# Patient Record
Sex: FEMALE | Race: BLACK OR AFRICAN AMERICAN | Employment: FULL TIME | URBAN - METROPOLITAN AREA
[De-identification: names, ages, dates, MRNs, and addresses within clinical notes are randomized per-mention and may not be internally consistent; named-entity substitution may affect disease eponyms.]

---

## 2017-04-06 ENCOUNTER — OFFICE VISIT (OUTPATIENT)
Dept: FAMILY MEDICINE CLINIC | Age: 52
End: 2017-04-06

## 2017-04-06 VITALS
TEMPERATURE: 97.4 F | WEIGHT: 196 LBS | HEIGHT: 65 IN | RESPIRATION RATE: 18 BRPM | OXYGEN SATURATION: 95 % | SYSTOLIC BLOOD PRESSURE: 128 MMHG | BODY MASS INDEX: 32.65 KG/M2 | HEART RATE: 84 BPM | DIASTOLIC BLOOD PRESSURE: 81 MMHG

## 2017-04-06 DIAGNOSIS — M54.50 ACUTE BILATERAL LOW BACK PAIN WITHOUT SCIATICA: Primary | ICD-10-CM

## 2017-04-06 LAB
BILIRUB UR QL STRIP: NEGATIVE
GLUCOSE UR-MCNC: NEGATIVE MG/DL
KETONES P FAST UR STRIP-MCNC: NEGATIVE MG/DL
PH UR STRIP: 7 [PH] (ref 4.6–8)
PROT UR QL STRIP: NORMAL MG/DL
SP GR UR STRIP: 1.02 (ref 1–1.03)
UA UROBILINOGEN AMB POC: NORMAL (ref 0.2–1)
URINALYSIS CLARITY POC: CLEAR
URINALYSIS COLOR POC: YELLOW
URINE BLOOD POC: NEGATIVE
URINE LEUKOCYTES POC: NEGATIVE
URINE NITRITES POC: NEGATIVE

## 2017-04-06 RX ORDER — CYCLOBENZAPRINE HCL 10 MG
10 TABLET ORAL
Qty: 15 TAB | Refills: 0 | Status: SHIPPED | OUTPATIENT
Start: 2017-04-06 | End: 2017-04-25 | Stop reason: ALTCHOICE

## 2017-04-06 RX ORDER — IBUPROFEN 800 MG/1
800 TABLET ORAL
Qty: 30 TAB | Refills: 0 | Status: SHIPPED | OUTPATIENT
Start: 2017-04-06 | End: 2017-04-25 | Stop reason: ALTCHOICE

## 2017-04-06 NOTE — PROGRESS NOTES
Patient: Lachelle Cuevas MRN: [id number]  SSN: [id number]    YOB: 1965  Age: 45 y.o. Sex: female      Date of Service: 4/6/2017   Provider: YADIEL Luke   Office Location:   [address] [facility], [address] [place] [address], [address]  Office Phone: [phone]  Office Fax: [phone]        REASON FOR VISIT:   Chief Complaint   Patient presents with    Side Pain     pt presents for right and left side pain  \" pt states \" that pt has been having this pain for about 4 days \" pt states \" that pain is really more to the back area\"        VITALS:   Visit Vitals    /81    Pulse 84    Temp 97.4 °F (36.3 °C) (Oral)    Resp 18    Ht 5' 5\" (1.651 m)    Wt 196 lb (88.9 kg)    LMP 01/30/2016    SpO2 95%    BMI 32.62 kg/m2       MEDICATIONS:   Current Outpatient Prescriptions   Medication Sig Dispense Refill    polyethylene glycol (MIRALAX) 17 gram/dose powder   0    amphetamine-dextroamphetamine XR (ADDERALL XR) 20 mg XR capsule Take 40 mg by mouth daily. 0    citalopram (CELEXA) 10 mg tablet Take  by mouth daily.  loratadine (CLARITIN) 10 mg tablet Take 10 mg by mouth daily as needed.  gabapentin (NEURONTIN) 100 mg capsule   0    oxyCODONE-acetaminophen (PERCOCET) 5-325 mg per tablet   0    aspirin delayed-release 81 mg tablet Take  by mouth daily.  simvastatin (ZOCOR) 20 mg tablet Take  by mouth nightly.           ALLERGIES:   Allergies   Allergen Reactions    Levaquin [Levofloxacin] Rash and Itching        ACTIVE MEDICAL PROBLEMS:  Patient Active Problem List   Diagnosis Code    Anemia D64.9    DDD (degenerative disc disease) [id number]    DJD (degenerative joint disease) of cervical spine M47.812    HLD (hyperlipidemia) E78.5    BMI 35.0-35.9,adult Z68.35    ADD (attention deficit disorder) F98.8    PTSD (post-traumatic stress disorder) F43.10    Depression F32.9        MEDICAL/SURGICAL HISTORY:  Past Medical History:   Diagnosis Date  Anemia 3/1/2010    Ankle fracture 5/09    right ankle    Cervical spine pain     Degenerative cervical disc     Seasonal allergic rhinitis     Thyroid disease       Past Surgical History:   Procedure Laterality Date    HX GI  1990\"    bowl obstruction repair    HX GYN  1's    etopic pregnancy    HX SUSANNA AND BSO  12/2015    HX TUBAL LIGATION          FAMILY HISTORY:  Family History   Problem Relation Age of Onset    Cancer Father      lung cancer    Diabetes Mother     Hypertension Mother     Diabetes Maternal Aunt     Diabetes Maternal Grandmother         SOCIAL HISTORY:  Social History   Substance Use Topics    Smoking status: Never Smoker    Smokeless tobacco: Never Used    Alcohol use Yes          HISTORY OF PRESENT ILLNESS:   Kierra Beard is a 45 y.o. female who presents to the office for acute care only. PCP: Mayank Robert at [facility]. Patient complains of gradual onset bilateral low back pain radiating x about 4 days. Pain is described as \"hot, dull and achy,\" and is rated a 6/10 at its worst. Patient has tried some OTC BC powder with little relief. She does have a history of lumbar DJD and has experienced sciatica in the past, but states this pain feels different. Pain is a bit more vague and \"deep\" and she is concerned that it might be coming from an organ. She denies distal numbness, tingling, or weakness. No bowel or bladder dysfunction. She notes that, if anything, chronic constipation has actually improved over the past week. REVIEW OF SYSTEMS:  Review of Systems   Constitutional: Negative for chills and fever. Respiratory: Negative for cough and shortness of breath. Cardiovascular: Negative for chest pain and palpitations. Gastrointestinal: Positive for constipation. Negative for abdominal pain, diarrhea, nausea and vomiting. Musculoskeletal: Positive for back pain and myalgias. Negative for neck pain. Neurological: Positive for headaches.  Negative for tingling, sensory change and focal weakness. PHYSICAL EXAMINATION:  Physical Exam   Constitutional: She is well-developed, well-nourished, and in no distress. Cardiovascular: Normal rate, regular rhythm and normal heart sounds. Exam reveals no gallop and no friction rub. No murmur heard. Pulmonary/Chest: Effort normal and breath sounds normal. She has no wheezes. She has no rales. Musculoskeletal:        Cervical back: She exhibits no bony tenderness, no edema and no deformity. Thoracic back: She exhibits no bony tenderness, no edema and no deformity. Lumbar back: She exhibits decreased range of motion ( AROM limited by pain in flexion and extension, but not lateral flexion or rotation), bony tenderness ( L spine and b/l SI joints), pain and spasm ( lumbar paraspinal). She exhibits no edema and no deformity. Back:    Skin: Skin is warm and dry. No rash noted. RESULTS:  Results for orders placed or performed in visit on 04/06/17   AMB POC URINALYSIS DIP STICK AUTO W/O MICRO   Result Value Ref Range    Color (UA POC) Yellow     Clarity (UA POC) Clear     Glucose (UA POC) Negative Negative    Bilirubin (UA POC) Negative Negative    Ketones (UA POC) Negative Negative    Specific gravity (UA POC) 1.025 1.001 - 1.035    Blood (UA POC) Negative Negative    pH (UA POC) 7.0 4.6 - 8.0    Protein (UA POC) 1+ Negative mg/dL    Urobilinogen (UA POC) 0.2 mg/dL 0.2 - 1    Nitrites (UA POC) Negative Negative    Leukocyte esterase (UA POC) Negative Negative        ASSESSMENT/PLAN:  Camillo Hodgkins was seen today for side pain. Diagnoses and all orders for this visit:    Acute bilateral low back pain without sciatica  - Reassured of normal urinalysis. Pain does seem to be musculoskeletal, but exact origin is difficult to pinpoint.   - Patient has known degenerative changes of L spine, but tenderness over b/l SI joints appears to be new.  Will order sacral x-ray for further evaluation  - In the meantime, will try flexeril and high dose ibuprofen for pain relief. Also encouraged use of ice or heat, whichever feels better  Orders:   -     AMB POC URINALYSIS DIP STICK AUTO W/O MICRO  -     XR SI JTS MIN 3 V; Future  -     cyclobenzaprine (FLEXERIL) 10 mg tablet; Take 1 Tab by mouth three (3) times daily as needed for Muscle Spasm(s). -     ibuprofen (MOTRIN) 800 mg tablet; Take 1 Tab by mouth every eight (8) hours as needed for Pain. Follow up with me or with PCP pending results of x-ray and response to medication    Patient expresses understanding and is agreeable with the above plan.         PATIENT CARE TEAM:   Patient Care Team:  Abena Chen NP as PCP - General (Family Practice)       YADIEL Hutton   April 6, 2017    5:13 PM

## 2017-04-07 ENCOUNTER — HOSPITAL ENCOUNTER (OUTPATIENT)
Age: 52
Discharge: HOME | End: 2017-04-07
Attending: ORTHOPAEDIC SURGERY
Payer: COMMERCIAL

## 2017-04-07 ENCOUNTER — HOSPITAL ENCOUNTER (OUTPATIENT)
Dept: GENERAL RADIOLOGY | Age: 52
Discharge: HOME | End: 2017-04-07
Attending: ORTHOPAEDIC SURGERY
Payer: COMMERCIAL

## 2017-04-07 DIAGNOSIS — M54.50 ACUTE BILATERAL LOW BACK PAIN WITHOUT SCIATICA: ICD-10-CM

## 2017-04-07 DIAGNOSIS — M25.561 RIGHT KNEE PAIN: ICD-10-CM

## 2017-04-07 PROCEDURE — 73721 MRI JNT OF LWR EXTRE W/O DYE: CPT

## 2017-04-07 PROCEDURE — 72202 X-RAY EXAM SI JOINTS 3/> VWS: CPT

## 2017-04-10 ENCOUNTER — TELEPHONE (OUTPATIENT)
Dept: FAMILY MEDICINE CLINIC | Age: 52
End: 2017-04-10

## 2017-04-10 NOTE — TELEPHONE ENCOUNTER
Please let the patient know that her x-ray was normal. No significant arthritis of the sacroiliac joints. Hopefully her pain is improving with the medications I prescribed, but if not, would recommend follow up with her PCP. thanks!

## 2017-04-25 ENCOUNTER — OFFICE VISIT (OUTPATIENT)
Dept: FAMILY MEDICINE CLINIC | Age: 52
End: 2017-04-25

## 2017-04-25 VITALS
WEIGHT: 197.2 LBS | TEMPERATURE: 98 F | BODY MASS INDEX: 32.86 KG/M2 | DIASTOLIC BLOOD PRESSURE: 88 MMHG | RESPIRATION RATE: 16 BRPM | HEART RATE: 77 BPM | SYSTOLIC BLOOD PRESSURE: 130 MMHG | OXYGEN SATURATION: 97 % | HEIGHT: 65 IN

## 2017-04-25 DIAGNOSIS — M51.26 DISCOGENIC SYNDROME, LUMBAR: Primary | ICD-10-CM

## 2017-04-25 DIAGNOSIS — G89.29 CHRONIC BILATERAL LOW BACK PAIN WITH BILATERAL SCIATICA: ICD-10-CM

## 2017-04-25 DIAGNOSIS — M54.42 CHRONIC BILATERAL LOW BACK PAIN WITH BILATERAL SCIATICA: ICD-10-CM

## 2017-04-25 DIAGNOSIS — Z11.59 NEED FOR HEPATITIS C SCREENING TEST: ICD-10-CM

## 2017-04-25 DIAGNOSIS — J30.1 SEASONAL ALLERGIC RHINITIS DUE TO POLLEN: ICD-10-CM

## 2017-04-25 DIAGNOSIS — M54.41 CHRONIC BILATERAL LOW BACK PAIN WITH BILATERAL SCIATICA: ICD-10-CM

## 2017-04-25 DIAGNOSIS — Z23 ENCOUNTER FOR IMMUNIZATION: ICD-10-CM

## 2017-04-25 RX ORDER — TRAMADOL HYDROCHLORIDE 50 MG/1
50 TABLET ORAL
Qty: 40 TAB | Refills: 0 | Status: SHIPPED | OUTPATIENT
Start: 2017-04-25 | End: 2018-09-07

## 2017-04-25 RX ORDER — GABAPENTIN 300 MG/1
CAPSULE ORAL
Qty: 60 CAP | Refills: 1 | Status: SHIPPED | OUTPATIENT
Start: 2017-04-25 | End: 2017-09-19 | Stop reason: SDUPTHER

## 2017-04-25 RX ORDER — CETIRIZINE HCL 10 MG
10 TABLET ORAL
Qty: 30 TAB | Refills: 3 | Status: SHIPPED | OUTPATIENT
Start: 2017-04-25

## 2017-04-25 NOTE — PROGRESS NOTES
1. Have you been to the ER, urgent care clinic since your last visit? Hospitalized since your last visit? NO    2. Have you seen or consulted any other health care providers outside of the [address] [place] [address] since your last visit? Include any pap smears or colon screening. NO      3. Vaccines?  tdap    Rx updated

## 2017-04-25 NOTE — PATIENT INSTRUCTIONS
Back Pain: Care Instructions  Your Care Instructions    Back pain has many possible causes. It is often related to problems with muscles and ligaments of the back. It may also be related to problems with the nerves, discs, or bones of the back. Moving, lifting, standing, sitting, or sleeping in an awkward way can strain the back. Sometimes you don't notice the injury until later. Arthritis is another common cause of back pain. Although it may hurt a lot, back pain usually improves on its own within several weeks. Most people recover in 12 weeks or less. Using good home treatment and being careful not to stress your back can help you feel better sooner. Follow-up care is a key part of your treatment and safety. Be sure to make and go to all appointments, and call your doctor if you are having problems. Its also a good idea to know your test results and keep a list of the medicines you take. How can you care for yourself at home? · Sit or lie in positions that are most comfortable and reduce your pain. Try one of these positions when you lie down:  ¨ Lie on your back with your knees bent and supported by large pillows. ¨ Lie on the floor with your legs on the seat of a sofa or chair. [place] on your side with your knees and hips bent and a pillow between your legs. ¨ Lie on your stomach if it does not make pain worse. · Do not sit up in bed, and avoid soft couches and twisted positions. Bed rest can help relieve pain at first, but it delays healing. Avoid bed rest after the first day of back pain. · Change positions every 30 minutes. If you must sit for long periods of time, take breaks from sitting. Get up and walk around, or lie in a comfortable position. · Try using a heating pad on a low or medium setting for 15 to 20 minutes every 2 or 3 hours. Try a warm shower in place of one session with the heating pad. · You can also try an ice pack for 10 to 15 minutes every 2 to 3 hours.  Put a thin cloth between the ice pack and your skin. · Take pain medicines exactly as directed. ¨ If the doctor gave you a prescription medicine for pain, take it as prescribed. ¨ If you are not taking a prescription pain medicine, ask your doctor if you can take an over-the-counter medicine. · Take short walks several times a day. You can start with 5 to 10 minutes, 3 or 4 times a day, and work up to longer walks. Walk on level surfaces and avoid hills and stairs until your back is better. · Return to work and other activities as soon as you can. Continued rest without activity is usually not good for your back. · To prevent future back pain, do exercises to stretch and strengthen your back and stomach. Learn how to use good posture, safe lifting techniques, and proper body mechanics. When should you call for help? Call your doctor now or seek immediate medical care if:  · You have new or worsening numbness in your legs. · You have new or worsening weakness in your legs. (This could make it hard to stand up.)  · You lose control of your bladder or bowels. Watch closely for changes in your health, and be sure to contact your doctor if:  · Your pain gets worse. · You are not getting better after 2 weeks. Where can you learn more? Go to http://abbe-katrina.info/. Enter J287 in the search box to learn more about \"Back Pain: Care Instructions. \"  Current as of: May 23, 2016  Content Version: 11.2  © 3162-2169 [employer]. Care instructions adapted under license by [employer] (which disclaims liability or warranty for this information). If you have questions about a medical condition or this instruction, always ask your healthcare professional. [address] any warranty or liability for your use of this information. Gabapentin (By mouth)   Gabapentin (tex-a-PEN-tin)  Treats seizures and pain caused by shingles.    Brand Name(s):ACTIVE-PAC with Gabapentin, FusePaq Fanatrex, Gralise, Neurontin   There may be other brand names for this medicine. When This Medicine Should Not Be Used: This medicine is not right for everyone. Do not use it if you had an allergic reaction to gabapentin. How to Use This Medicine:   Capsule, Liquid, Tablet  · Take your medicine as directed. Your dose may need to be changed several times to find what works best for you. If you have epilepsy, do not allow more than 12 hours to pass between doses. · Capsule: Swallow the capsule whole with plenty of water. Do not open, crush, or chew it. · Gralise® tablet: Swallow the tablet whole . Do not crush, break, or chew it. · Neurontin® tablet: If you break a tablet into 2 pieces, use the second half as your next dose. If you don't use it within 28 days, throw it away. · Measure the oral liquid medicine with a marked measuring spoon, oral syringe, or medicine cup. · This medicine should come with a Medication Guide. Ask your pharmacist for a copy if you do not have one. · Missed dose: Take a dose as soon as you remember. If it is almost time for your next dose, wait until then and take a regular dose. Do not take extra medicine to make up for a missed dose. · Store the medicine in a closed container at room temperature, away from heat, moisture, and direct light. Store the Neurontin® oral liquid in the refrigerator. Do not freeze. Drugs and Foods to Avoid:   Ask your doctor or pharmacist before using any other medicine, including over-the-counter medicines, vitamins, and herbal products. · Some medicines can affect how gabapentin works. Tell your doctor if you also use any of the following:   ¨ Hydrocodone  ¨ Morphine  · If you take an antacid, wait at least 2 hours before you take gabapentin. · Tell your doctor if you use anything else that makes you sleepy. Some examples are allergy medicine, narcotic pain medicine, and alcohol.   Warnings While Using This Medicine:   · Tell your doctor if you are pregnant or breastfeeding, or if you have kidney problems or are receiving dialysis. Tell your doctor if you have a history of depression or mental health problems. · This medicine may increase depression or thoughts of suicide. Tell your doctor right away if you start to feel more depressed or think about hurting yourself. · This medicine may cause a serious allergic reaction called multiorgan hypersensitivity, which can damage organs and be life-threatening. · Do not stop using this medicine suddenly. Your doctor will need to slowly decrease your dose before you stop it completely. If you take this medicine to prevent seizures, your seizures may return or occur more often if you stop this medicine suddenly. · This medicine may make you dizzy or drowsy. Do not drive or do anything else that could be dangerous until you know how this medicine affects you. · Tell any doctor or dentist who treats you that you are using this medicine. This medicine may affect certain medical test results. · Your doctor will check your progress and the effects of this medicine at regular visits. Keep all appointments. · Keep all medicine out of the reach of children. Never share your medicine with anyone.   Possible Side Effects While Using This Medicine:   Call your doctor right away if you notice any of these side effects:  · Allergic reaction: Itching or hives, swelling in your face or hands, swelling or tingling in your mouth or throat, chest tightness, trouble breathing  · Behavior problems, aggression, restlessness, trouble concentrating, moodiness (especially in children)  · Blistering, peeling, red skin rash  · Change in how much or how often you urinate, bloody or cloudy urine,  · Chest pain, fast heartbeat, trouble breathing  · Dark urine or pale stools, nausea, vomiting, loss of appetite, stomach pain, yellow skin or eyes  · Fever, rash, swollen or tender glands in the neck, armpit, or groin  · Problems with coordination, shakiness, unsteadiness  · Rapid weight gain, swelling in your hands, ankles, or feet  · Unusual moods or behaviors, thoughts of hurting yourself, feeling depressed  If you notice these less serious side effects, talk with your doctor:   · Dizziness, drowsiness, sleepiness, tiredness  If you notice other side effects that you think are caused by this medicine, tell your doctor. Call your doctor for medical advice about side effects. You may report side effects to FDA at 8-377-AQX-6131  © 2016 [address] is for End User's use only and may not be sold, redistributed or otherwise used for commercial purposes. The above information is an  only. It is not intended as medical advice for individual conditions or treatments. Talk to your doctor, nurse or pharmacist before following any medical regimen to see if it is safe and effective for you. Tramadol (By mouth)   Tramadol (TRAM-a-dol)  Treats moderate to severe pain. This medicine is a narcotic pain reliever. Brand Name(s):ConZip, FusePaq Synapryn, [place], Ultram, Ultram ER, traMADol HCl   There may be other brand names for this medicine. When This Medicine Should Not Be Used: This medicine is not right for everyone. Do not use if you had an allergic reaction to tramadol or other narcotic medicine. Tell your doctor if you have severe asthma or other breathing problems. How to Use This Medicine:   Liquid, Capsule, Tablet, Dissolving Tablet, Long Acting Tablet  · Take your medicine as directed. Your dose may need to be changed several times to find what works best for you. · Make sure your hands are dry before you handle the disintegrating tablet. Peel back the foil from the blister pack, then remove the tablet. Do not push the tablet through the foil. Place the tablet in your mouth. After it has melted, swallow or take a drink of water. · Swallow the extended-release tablet whole.  Do not crush, break, or chew it. · Drink plenty of liquids to help avoid constipation. · Missed dose: Take a dose as soon as you remember. If it is almost time for your next dose, wait until then and take a regular dose. Do not take extra medicine to make up for a missed dose. · Store the medicine in a closed container at room temperature, away from heat, moisture, and direct light. Drugs and Foods to Avoid:   Ask your doctor or pharmacist before using any other medicine, including over-the-counter medicines, vitamins, and herbal products. · Some medicines and foods can affect how tramadol works. Tell your doctor if you are using an MAO inhibitor or medicine for depression (such as amitriptyline, fluoxetine, paroxetine). · Tell your doctor if you are also using any of the following:  ¨ Cale's wort, digoxin, erythromycin, ketoconazole, linezolid, lithium, quinidine, rifampin, promethazine, carbamazepine, or cyclobenzaprine  ¨ Blood thinner (such as warfarin), medicine for migraine headaches, or another narcotic medicine  · Tell your doctor if you use anything else that makes you sleepy. Some examples are allergy medicine, narcotic pain medicine, and alcohol. · Do not drink alcohol while you are using this medicine. Warnings While Using This Medicine:   · Tell your doctor if you are pregnant or breastfeeding, or if you have kidney disease, liver disease (including cirrhosis), or breathing problems. Tell your doctor if you have a history of head injury, seizures, drug addiction, or depression or similar emotional problems. · This medicine may cause the following problems:  ¨ High risk of overdose  ¨ Serotonin syndrome  · This medicine may make you dizzy or drowsy. Do not drive or doing anything else that could be dangerous until you know how this medicine affects you. · This medicine can be habit-forming. Do not use more than your prescribed dose. Call your doctor if you think your medicine is not working.   · Do not stop using this medicine suddenly. Your doctor will need to slowly decrease your dose before you stop it completely. · Tell any doctor or dentist who treats you that you are using this medicine. · Some forms of this medicine contain phenylalanine (aspartame). · This medicine may cause constipation, especially with long-term use. Ask your doctor if you should use a laxative to prevent and treat constipation. · Keep all medicine out of the reach of children. Never share your medicine with anyone. Possible Side Effects While Using This Medicine:   Call your doctor right away if you notice any of these side effects:  · Allergic reaction: Itching or hives, swelling in your face or hands, swelling or tingling in your mouth or throat, chest tightness, trouble breathing  · Anxiety, restlessness, fast heartbeat, fever, sweating, muscle spasms, nausea, vomiting, diarrhea, seeing or hearing things that are not there  · Blistering, peeling, or red skin rash  · Lightheadedness, dizziness, or fainting  · Seizures  · Severe sleepiness or unusual drowsiness  · Trouble breathing  If you notice these less serious side effects, talk with your doctor:   · Feeling nervous or shaky  · Headache  · Mild itching  · Nausea, vomiting, constipation, loss of appetite  · Weakness  If you notice other side effects that you think are caused by this medicine, tell your doctor. Call your doctor for medical advice about side effects. You may report side effects to FDA at 4-801-FDA-9023  © 2016 [address] is for End User's use only and may not be sold, redistributed or otherwise used for commercial purposes. The above information is an  only. It is not intended as medical advice for individual conditions or treatments. Talk to your doctor, nurse or pharmacist before following any medical regimen to see if it is safe and effective for you.   Cetirizine (By mouth)   Cetirizine ([id number]-[id number])  Treats hay fever and allergy symptoms, hives, and itching. Brand Name(s):[facility] Allergy, [facility] All Day Allergy, Children's Cetirizine Hydrochloride, [facility] Day Allergy, [facility]'s ZyrTEC, [facility] ZyrTEC Allergy, [facility] All Day Allergy, [facility] All Day Allergy, [facility] Day Allergy, [facility] All Day Allergy, [facility], [place] Cetirizine Hydrochloride, [facility] Allergy Relief, [facility] Cetirizine Hydrochloride, [facility] Children[facility] Allergy Relief   There may be other brand names for this medicine. When This Medicine Should Not Be Used: You should not use this medicine if you have had an allergic reaction to cetirizine or hydroxyzine (Atarax®, Vistaril®). Do not give any over-the-counter (OTC) cough and cold medicine to a baby or child under 3years old. Using these medicines in very young children might cause serious or possibly life-threatening side effects. How to Use This Medicine:   Tablet, Chewable Tablet, Capsule, Liquid  · Your doctor will tell you how much medicine to use. Do not use more than directed. · You may take this medicine with or without food. · Measure the oral liquid medicine with a marked measuring spoon, oral syringe, or medicine cup. · Chew the chewable tablet thoroughly before you swallow it. You may also let the chewable tablet melt slowly in your mouth. · Swallow the tablet whole with a full glass of water. Do not chew, crush, or break it. If a dose is missed:   · Take a dose as soon as you remember. If it is almost time for your next dose, wait until then and take a regular dose. Do not take extra medicine to make up for a missed dose. How to Store and Dispose of This Medicine:   · Store the medicine in a closed container at room temperature, away from heat, moisture, and direct light. · The liquid medicine may be kept in the refrigerator. Do not freeze.   · Ask your pharmacist, doctor, or health caregiver about the best way to dispose of any outdated medicine or medicine no longer needed. · Keep all medicine out of the reach of children. Never share your medicine with anyone. Drugs and Foods to Avoid:   Ask your doctor or pharmacist before using any other medicine, including over-the-counter medicines, vitamins, and herbal products. · Make sure your doctor knows if you are also using theophylline. · Avoid drinking alcohol while using this medicine. · Make sure your doctor knows if you are also using other medicines that might make you sleepy such as cold or allergy medicines, muscle relaxants, tranquilizers, sleeping pills, or strong pain killers. Warnings While Using This Medicine:   · Talk with your doctor before taking this medicine if you are pregnant or breast feeding. · Check with your doctor before using this medicine if you have kidney disease or liver disease. · This medicine may make you dizzy or drowsy. Avoid driving, using machines, or doing anything else that could be dangerous if you are not alert. Possible Side Effects While Using This Medicine:   Call your doctor right away if you notice any of these side effects:  · Allergic reaction: Itching or hives, swelling in your face or hands, swelling or tingling in your mouth or throat, chest tightness, trouble breathing  · Skin or whites of your eyes turn yellow. If you notice these less serious side effects, talk with your doctor:   · Drowsiness or dizziness. · Dry mouth. If you notice other side effects that you think are caused by this medicine, tell your doctor. Call your doctor for medical advice about side effects. You may report side effects to FDA at 1-750-FDA-7843  © 2016 [address] is for End User's use only and may not be sold, redistributed or otherwise used for commercial purposes. The above information is an  only.  It is not intended as medical advice for individual conditions or treatments. Talk to your doctor, nurse or pharmacist before following any medical regimen to see if it is safe and effective for you.

## 2017-04-25 NOTE — MR AVS SNAPSHOT
Visit Information Date & Time Provider Department Dept. Phone Encounter #  
 4/25/2017  7:45 AM Brandi Rangel  [address] [id number] Upcoming Health Maintenance Date Due  
 BREAST CANCER SCRN MAMMOGRAM 7/1/2017 PAP AKA CERVICAL CYTOLOGY 7/1/2018 COLONOSCOPY 6/21/2021 DTaP/Tdap/Td series (2 - Td) 4/25/2027 Allergies as of 4/25/2017  Review Complete On: 4/25/2017 By: Brandi Rangel NP Severity Noted Reaction Type Reactions Levaquin [Levofloxacin]  03/01/2010   Topical Rash, Itching Current Immunizations  Reviewed on 12/16/2016 Name Date Influenza Vaccine 11/16/2016, 11/15/2013 Influenza Vaccine (Quad) PF 8/25/2015, 9/29/2014 Influenza Vaccine Split 10/17/2012 PPD 7/26/2011 TB Skin Test (PPD) Intradermal 12/16/2016, 3/16/2015  2:25 PM  
 Tdap  Incomplete Not reviewed this visit You Were Diagnosed With   
  
 Codes Comments Discogenic syndrome, lumbar    -  Primary ICD-10-CM: M51.26 
ICD-9-CM: 722.10 Encounter for immunization     ICD-10-CM: O16 ICD-9-CM: V03.89 Chronic bilateral low back pain with bilateral sciatica     ICD-10-CM: M54.42, M54.41, G89.29 ICD-9-CM: 724.2, 724.3, 338.29 Need for hepatitis C screening test     ICD-10-CM: Z11.59 
ICD-9-CM: V73.89 Acute bilateral low back pain without sciatica     ICD-10-CM: M54.5 ICD-9-CM: 724.2, 338.19 Seasonal allergic rhinitis due to pollen     ICD-10-CM: J30.1 ICD-9-CM: 477.0 Vitals BP Pulse Temp Resp Height(growth percentile) Weight(growth percentile) 130/88 (BP 1 Location: Left arm, BP Patient Position: Sitting) 77 98 °F (36.7 °C) (Oral) 16 5' 5\" (1.651 m) 197 lb 3.2 oz (89.4 kg) LMP SpO2 BMI OB Status Smoking Status 01/30/2016 97% 32.82 kg/m2 Hysterectomy Never Smoker Vitals History BMI and BSA Data  Body Mass Index Body Surface Area  
 32.82 kg/m 2 2.02 m 2  
  
  
 Preferred Pharmacy Pharmacy Name Phone [id number] [address] [phone] Your Updated Medication List  
  
   
This list is accurate as of: 4/25/17  8:08 AM.  Always use your most recent med list.  
  
  
  
  
 amphetamine-dextroamphetamine XR 20 mg XR capsule Commonly known as:  ADDERALL XR Take 40 mg by mouth daily. aspirin delayed-release 81 mg tablet Take  by mouth daily. cetirizine 10 mg tablet Commonly known as:  ZYRTEC Take 1 Tab by mouth nightly as needed for Allergies. citalopram 10 mg tablet Commonly known as:  Rell Breeding Take  by mouth daily. gabapentin 300 mg capsule Commonly known as:  NEURONTIN Take one pill nightly for three nights by mouth and if tolerated increase to twice daily by mouth  
  
 polyethylene glycol 17 gram/dose powder Commonly known as:  MIRALAX  
  
 simvastatin 20 mg tablet Commonly known as:  ZOCOR Take  by mouth nightly. traMADol 50 mg tablet Commonly known as:  ULTRAM  
Take 1 Tab by mouth every six (6) hours as needed for Pain. Max Daily Amount: 200 mg. Indications: Pain Prescriptions Printed Refills  
 traMADol (ULTRAM) 50 mg tablet 0 Sig: Take 1 Tab by mouth every six (6) hours as needed for Pain. Max Daily Amount: 200 mg. Indications: Pain Class: Print Route: Oral  
  
Prescriptions Sent to Pharmacy Refills  
 gabapentin (NEURONTIN) 300 mg capsule 1 Sig: Take one pill nightly for three nights by mouth and if tolerated increase to twice daily by mouth Class: Normal  
 Pharmacy: [id number] [address]. Ph #: [phone]  
 cetirizine (ZYRTEC) 10 mg tablet 3 Sig: Take 1 Tab by mouth nightly as needed for Allergies. Class: Normal  
 Pharmacy: [id number] [address]. Ph #: [phone] Route: Oral  
  
We Performed the Following REFERRAL TO ORTHOPEDICS [RXM558 Custom] Comments:  
 Please evaluate for chronic low back pain, discogenic disease with radiculopathy (old patient returning see MRI 2013) TETANUS, DIPHTHERIA TOXOIDS AND ACELLULAR PERTUSSIS VACCINE (TDAP), IN INDIVIDS. >=7, IM [id number] CPT(R)] To-Do List   
 04/25/2017 Lab:  HEPATITIS C AB Referral Information Referral ID Referred By Referred To  
  
 [id number] Luis Daniel Cervantes, Ellis Butterfield MD   
   [address] [place] [address], [address] Phone: [phone] Fax: [phone] Visits Status Start Date End Date 1 New Request 4/25/17 4/25/18 If your referral has a status of pending review or denied, additional information will be sent to support the outcome of this decision. Patient Instructions Back Pain: Care Instructions Your Care Instructions Back pain has many possible causes. It is often related to problems with muscles and ligaments of the back. It may also be related to problems with the nerves, discs, or bones of the back. Moving, lifting, standing, sitting, or sleeping in an awkward way can strain the back. Sometimes you don't notice the injury until later. Arthritis is another common cause of back pain. Although it may hurt a lot, back pain usually improves on its own within several weeks. Most people recover in 12 weeks or less. Using good home treatment and being careful not to stress your back can help you feel better sooner. Follow-up care is a key part of your treatment and safety. Be sure to make and go to all appointments, and call your doctor if you are having problems. Its also a good idea to know your test results and keep a list of the medicines you take. How can you care for yourself at home? · Sit or lie in positions that are most comfortable and reduce your pain. Try one of these positions when you lie down: ¨ Lie on your back with your knees bent and supported by large pillows. ¨ Lie on the floor with your legs on the seat of a sofa or chair. Siri Blazer on your side with your knees and hips bent and a pillow between your legs. ¨ Lie on your stomach if it does not make pain worse. · Do not sit up in bed, and avoid soft couches and twisted positions. Bed rest can help relieve pain at first, but it delays healing. Avoid bed rest after the first day of back pain. · Change positions every 30 minutes. If you must sit for long periods of time, take breaks from sitting. Get up and walk around, or lie in a comfortable position. · Try using a heating pad on a low or medium setting for 15 to 20 minutes every 2 or 3 hours. Try a warm shower in place of one session with the heating pad. · You can also try an ice pack for 10 to 15 minutes every 2 to 3 hours. Put a thin cloth between the ice pack and your skin. · Take pain medicines exactly as directed. ¨ If the doctor gave you a prescription medicine for pain, take it as prescribed. ¨ If you are not taking a prescription pain medicine, ask your doctor if you can take an over-the-counter medicine. · Take short walks several times a day. You can start with 5 to 10 minutes, 3 or 4 times a day, and work up to longer walks. Walk on level surfaces and avoid hills and stairs until your back is better. · Return to work and other activities as soon as you can. Continued rest without activity is usually not good for your back. · To prevent future back pain, do exercises to stretch and strengthen your back and stomach. Learn how to use good posture, safe lifting techniques, and proper body mechanics. When should you call for help? Call your doctor now or seek immediate medical care if: 
· You have new or worsening numbness in your legs. · You have new or worsening weakness in your legs. (This could make it hard to stand up.) · You lose control of your bladder or bowels. Watch closely for changes in your health, and be sure to contact your doctor if: · Your pain gets worse. · You are not getting better after 2 weeks. Where can you learn more? Go to http://abbe-katrina.info/. Enter F684 in the search box to learn more about \"Back Pain: Care Instructions. \" Current as of: May 23, 2016 Content Version: 11.2 © 6960-9581 [employer]. Care instructions adapted under license by [employer] (which disclaims liability or warranty for this information). If you have questions about a medical condition or this instruction, always ask your healthcare professional. [address] any warranty or liability for your use of this information. Gabapentin (By mouth) Gabapentin (tex-a-PEN-tin) Treats seizures and pain caused by shingles. Brand Name(s):ACTIVE-PAC with Gabapentin, FusePaq Fanatrex, Gralise, Neurontin There may be other brand names for this medicine. When This Medicine Should Not Be Used: This medicine is not right for everyone. Do not use it if you had an allergic reaction to gabapentin. How to Use This Medicine:  
Capsule, Liquid, Tablet · Take your medicine as directed. Your dose may need to be changed several times to find what works best for you. If you have epilepsy, do not allow more than 12 hours to pass between doses. · Capsule: Swallow the capsule whole with plenty of water. Do not open, crush, or chew it. · Gralise® tablet: Swallow the tablet whole . Do not crush, break, or chew it. · Neurontin® tablet: If you break a tablet into 2 pieces, use the second half as your next dose. If you don't use it within 28 days, throw it away. · Measure the oral liquid medicine with a marked measuring spoon, oral syringe, or medicine cup. · This medicine should come with a Medication Guide. Ask your pharmacist for a copy if you do not have one. · Missed dose: Take a dose as soon as you remember. If it is almost time for your next dose, wait until then and take a regular dose.  Do not take extra medicine to make up for a missed dose. · Store the medicine in a closed container at room temperature, away from heat, moisture, and direct light. Store the Neurontin® oral liquid in the refrigerator. Do not freeze. Drugs and Foods to Avoid: Ask your doctor or pharmacist before using any other medicine, including over-the-counter medicines, vitamins, and herbal products. · Some medicines can affect how gabapentin works. Tell your doctor if you also use any of the following: ¨ Hydrocodone ¨ Morphine · If you take an antacid, wait at least 2 hours before you take gabapentin. · Tell your doctor if you use anything else that makes you sleepy. Some examples are allergy medicine, narcotic pain medicine, and alcohol. Warnings While Using This Medicine: · Tell your doctor if you are pregnant or breastfeeding, or if you have kidney problems or are receiving dialysis. Tell your doctor if you have a history of depression or mental health problems. · This medicine may increase depression or thoughts of suicide. Tell your doctor right away if you start to feel more depressed or think about hurting yourself. · This medicine may cause a serious allergic reaction called multiorgan hypersensitivity, which can damage organs and be life-threatening. · Do not stop using this medicine suddenly. Your doctor will need to slowly decrease your dose before you stop it completely. If you take this medicine to prevent seizures, your seizures may return or occur more often if you stop this medicine suddenly. · This medicine may make you dizzy or drowsy. Do not drive or do anything else that could be dangerous until you know how this medicine affects you. · Tell any doctor or dentist who treats you that you are using this medicine. This medicine may affect certain medical test results. · Your doctor will check your progress and the effects of this medicine at regular visits. Keep all appointments. · Keep all medicine out of the reach of children. Never share your medicine with anyone. Possible Side Effects While Using This Medicine:  
Call your doctor right away if you notice any of these side effects: · Allergic reaction: Itching or hives, swelling in your face or hands, swelling or tingling in your mouth or throat, chest tightness, trouble breathing · Behavior problems, aggression, restlessness, trouble concentrating, moodiness (especially in children) · Blistering, peeling, red skin rash · Change in how much or how often you urinate, bloody or cloudy urine, 
· Chest pain, fast heartbeat, trouble breathing · Dark urine or pale stools, nausea, vomiting, loss of appetite, stomach pain, yellow skin or eyes · Fever, rash, swollen or tender glands in the neck, armpit, or groin · Problems with coordination, shakiness, unsteadiness · Rapid weight gain, swelling in your hands, ankles, or feet · Unusual moods or behaviors, thoughts of hurting yourself, feeling depressed If you notice these less serious side effects, talk with your doctor: · Dizziness, drowsiness, sleepiness, tiredness If you notice other side effects that you think are caused by this medicine, tell your doctor. Call your doctor for medical advice about side effects. You may report side effects to FDA at 1-009-FDA-9506 © 2016 [address] is for End User's use only and may not be sold, redistributed or otherwise used for commercial purposes. The above information is an  only. It is not intended as medical advice for individual conditions or treatments. Talk to your doctor, nurse or pharmacist before following any medical regimen to see if it is safe and effective for you. Tramadol (By mouth) Tramadol (TRAM-a-dol) Treats moderate to severe pain. This medicine is a narcotic pain reliever. Brand Name(s):ConZip, FusePaq Synapryn, Ryzolt, Ultram, Ultram ER, traMADol HCl There may be other brand names for this medicine. When This Medicine Should Not Be Used: This medicine is not right for everyone. Do not use if you had an allergic reaction to tramadol or other narcotic medicine. Tell your doctor if you have severe asthma or other breathing problems. How to Use This Medicine:  
Liquid, Capsule, Tablet, Dissolving Tablet, Long Acting Tablet · Take your medicine as directed. Your dose may need to be changed several times to find what works best for you. · Make sure your hands are dry before you handle the disintegrating tablet. Peel back the foil from the blister pack, then remove the tablet. Do not push the tablet through the foil. Place the tablet in your mouth. After it has melted, swallow or take a drink of water. · Swallow the extended-release tablet whole. Do not crush, break, or chew it. · Drink plenty of liquids to help avoid constipation. · Missed dose: Take a dose as soon as you remember. If it is almost time for your next dose, wait until then and take a regular dose. Do not take extra medicine to make up for a missed dose. · Store the medicine in a closed container at room temperature, away from heat, moisture, and direct light. Drugs and Foods to Avoid: Ask your doctor or pharmacist before using any other medicine, including over-the-counter medicines, vitamins, and herbal products. · Some medicines and foods can affect how tramadol works. Tell your doctor if you are using an MAO inhibitor or medicine for depression (such as amitriptyline, fluoxetine, paroxetine). · Tell your doctor if you are also using any of the following: 
¨ Cale's wort, digoxin, erythromycin, ketoconazole, linezolid, lithium, quinidine, rifampin, promethazine, carbamazepine, or cyclobenzaprine ¨ Blood thinner (such as warfarin), medicine for migraine headaches, or another narcotic medicine · Tell your doctor if you use anything else that makes you sleepy.  Some examples are allergy medicine, narcotic pain medicine, and alcohol. · Do not drink alcohol while you are using this medicine. Warnings While Using This Medicine: · Tell your doctor if you are pregnant or breastfeeding, or if you have kidney disease, liver disease (including cirrhosis), or breathing problems. Tell your doctor if you have a history of head injury, seizures, drug addiction, or depression or similar emotional problems. · This medicine may cause the following problems: 
¨ High risk of overdose ¨ Serotonin syndrome · This medicine may make you dizzy or drowsy. Do not drive or doing anything else that could be dangerous until you know how this medicine affects you. · This medicine can be habit-forming. Do not use more than your prescribed dose. Call your doctor if you think your medicine is not working. · Do not stop using this medicine suddenly. Your doctor will need to slowly decrease your dose before you stop it completely. · Tell any doctor or dentist who treats you that you are using this medicine. · Some forms of this medicine contain phenylalanine (aspartame). · This medicine may cause constipation, especially with long-term use. Ask your doctor if you should use a laxative to prevent and treat constipation. · Keep all medicine out of the reach of children. Never share your medicine with anyone. Possible Side Effects While Using This Medicine:  
Call your doctor right away if you notice any of these side effects: · Allergic reaction: Itching or hives, swelling in your face or hands, swelling or tingling in your mouth or throat, chest tightness, trouble breathing · Anxiety, restlessness, fast heartbeat, fever, sweating, muscle spasms, nausea, vomiting, diarrhea, seeing or hearing things that are not there · Blistering, peeling, or red skin rash · Lightheadedness, dizziness, or fainting · Seizures · Severe sleepiness or unusual drowsiness · Trouble breathing If you notice these less serious side effects, talk with your doctor: · Feeling nervous or shaky · Headache · Mild itching · Nausea, vomiting, constipation, loss of appetite · Weakness If you notice other side effects that you think are caused by this medicine, tell your doctor. Call your doctor for medical advice about side effects. You may report side effects to FDA at 0-967-FDA-6009 © 2016 [address] is for End User's use only and may not be sold, redistributed or otherwise used for commercial purposes. The above information is an  only. It is not intended as medical advice for individual conditions or treatments. Talk to your doctor, nurse or pharmacist before following any medical regimen to see if it is safe and effective for you. Cetirizine (By mouth) Cetirizine (uy-JPF-p-zeen) Treats hay fever and allergy symptoms, hives, and itching. Brand Name(s):[facility], [facility] All Day Allergy, Children's Cetirizine Hydrochloride, Children's [facility] All Day Allergy, Children's ZyrTEC, [facility] ZyrTEC Allergy, [facility] All Day Allergy, [facility] Children[facility] All Day Allergy, [facility] Day Allergy, [facility] All Day Allergy, [facility] Day All[facility], [place] Cetirizine Hydrochloride, [facility] Allergy Relief, [facility] Cetirizine Hydrochloride, [facility] Children[facility] Allergy Relief There may be other brand names for this medicine. When This Medicine Should Not Be Used: You should not use this medicine if you have had an allergic reaction to cetirizine or hydroxyzine (Atarax®, Vistaril®). Do not give any over-the-counter (OTC) cough and cold medicine to a baby or child under 3years old. Using these medicines in very young children might cause serious or possibly life-threatening side effects. How to Use This Medicine:  
Tablet, Chewable Tablet, Capsule, Liquid · Your doctor will tell you how much medicine to use. Do not use more than directed. · You may take this medicine with or without food. · Measure the oral liquid medicine with a marked measuring spoon, oral syringe, or medicine cup. · Chew the chewable tablet thoroughly before you swallow it. You may also let the chewable tablet melt slowly in your mouth. · Swallow the tablet whole with a full glass of water. Do not chew, crush, or break it. If a dose is missed: · Take a dose as soon as you remember. If it is almost time for your next dose, wait until then and take a regular dose. Do not take extra medicine to make up for a missed dose. How to Store and Dispose of This Medicine: · Store the medicine in a closed container at room temperature, away from heat, moisture, and direct light. · The liquid medicine may be kept in the refrigerator. Do not freeze. · Ask your pharmacist, doctor, or health caregiver about the best way to dispose of any outdated medicine or medicine no longer needed. · Keep all medicine out of the reach of children. Never share your medicine with anyone. Drugs and Foods to Avoid: Ask your doctor or pharmacist before using any other medicine, including over-the-counter medicines, vitamins, and herbal products. · Make sure your doctor knows if you are also using theophylline. · Avoid drinking alcohol while using this medicine. · Make sure your doctor knows if you are also using other medicines that might make you sleepy such as cold or allergy medicines, muscle relaxants, tranquilizers, sleeping pills, or strong pain killers. Warnings While Using This Medicine: · Talk with your doctor before taking this medicine if you are pregnant or breast feeding. · Check with your doctor before using this medicine if you have kidney disease or liver disease. · This medicine may make you dizzy or drowsy.  Avoid driving, using machines, or doing anything else that could be dangerous if you are not alert. Possible Side Effects While Using This Medicine:  
Call your doctor right away if you notice any of these side effects: · Allergic reaction: Itching or hives, swelling in your face or hands, swelling or tingling in your mouth or throat, chest tightness, trouble breathing · Skin or whites of your eyes turn yellow. If you notice these less serious side effects, talk with your doctor: · Drowsiness or dizziness. · Dry mouth. If you notice other side effects that you think are caused by this medicine, tell your doctor. Call your doctor for medical advice about side effects. You may report side effects to FDA at 3-352-XEK-2076 © 2016 [address] is for End User's use only and may not be sold, redistributed or otherwise used for commercial purposes. The above information is an  only. It is not intended as medical advice for individual conditions or treatments. Talk to your doctor, nurse or pharmacist before following any medical regimen to see if it is safe and effective for you. Introducing [facility]! Dear Jose Toth: Thank you for requesting a [employer] account. Our records indicate that you already have an active [employer] [facility] account. You can access your account anytime at [email or web address] Did you know that you can access your hospital and ER discharge instructions at any time in [employer]? You can also review all of your test results from your hospital stay or ER visit. Additional Information If you have questions, please visit the Frequently Asked Questions section of the [employer] website at [email or web address] Remember, [employer] [facility] is NOT to be used for urgent needs. For medical emergencies, dial 911. Now available from your iPhone and Android! Please provide this summary of care documentation to your next provider. Your primary care clinician is listed as Ricky Cervantes. If you have any questions after today's visit, please call [phone].

## 2017-04-25 NOTE — PROGRESS NOTES
Subjective:   Renetta Ivey is a 45 y.o. female presents today with increasing low back pain. She has been treated by several providers since before 2010. She is currently seeing a provider at the [facility] and was referred to chiropractic care. She reports that her pain is lumbar and spreads bilaterally across lumbar and into hips and groin and lower extremities bilaterally. She reports pain aching, burning at times and moderate to severe. Pain today 8/10. ROS: denies falls, denies antalgic gait, +taking Ibuprofen 800 mg pain several times per day mild improvement in pain and burning. Recent PMH: [facility] complaint with imaging as follow:  Result Information   Status Provider Status      Final result (Exam End: 4/7/2017 10:17 AM) Reviewed    Study Result   Sacroiliac joints        HISTORY: Sacroiliac joint pain 5-6 days right greater than left, reportedly no  trauma.     COMPARISON: [facility] pelvic CT 2015.     FINDINGS: 3 views were obtained including AP view of the sacroiliac joints and  bilateral oblique views.     The sacroiliac joints appear preserved. No erosive change identified. No  sclerosis evident. No fracture or malalignment.           IMPRESSION  IMPRESSION:      No significant diagnostic abnormality          Results   MRI LUMB SPINE WO CONT (Accession [id number]) (Order [id number])   Allergies      Unspecified: Levaquin [Levofloxacin]   Result Information   Status Provider Status      Final result (Exam End: 6/27/2013  6:46 AM) Open    Study Result   PROCEDURE: MRI of the lumbar spine without contrast.     CPT CODE: 30333     INDICATIONS: Low back pain, left leg pain. 722. 52.  Left-sided radiculopathy     COMPARISONS: None.     TECHNIQUE: T1 weighted, T2 FSE, and FSE inversion recovery sagittal images are  supplemented by T2 weighted and T1 weighted axial images.     FINDINGS:     Alignment is normal.  Vertebral body heights are normal. No acute or chronic fractures. Disc heights are maintained throughout. Desiccative changes consistent with  mild degenerative change L3/4, L4/5, and L5/S1. Marrow signal is not pathologic. No vertebral body edema. No mass. Conus terminates at the lower L1 level with normal signal.     Correlation of axial and sagittal data through the disc levels:     L1/2: Disc And Central Canal: No significant disc pathology or central stenosis. Facets: No significant arthropathy. Right Foramen: No stenosis. Left Foramen: No stenosis.     L2/3: Disc And Central Canal: No significant disc pathology or central stenosis. Facets: Mild lateral facet arthropathy  Right Foramen: No stenosis. Left Foramen: No stenosis.     L3/4: Disc And Central Canal: Mild posterior disc bulge slightly more focal to  the left of midline extending into the left foramen and left lateral. (Axial T2  series 5, image 16). There is some contact with the exiting left L3 nerve root  far lateral, but no definite impingement. No significant central canal  stenosis. Facets: Mild bilateral facet arthropathy  Right Foramen: No stenosis. Left Foramen: No stenosis.     L4/5: Disc And Central Canal: Posterior disc bulge with slightly more focal  central disc protrusion. Indentation of the ventral CSF space. Combines with  facet arthropathy and mild ligamentum flavum thickening to cause mild central  canal stenosis. Some encroachment on the L5 nerve roots in the lateral recesses  left worse than right (axial T2 images 9, 10). Far lateral the disc bulge may  contact but not definitely compress the left L4 nerve root. Facets: Mild to moderate bilateral facet arthropathy  Right Foramen: No stenosis. Left Foramen: No stenosis.     L5/S1: Disc And Central Canal: Central disc protrusion/extrusion with tiny  component extending inferiorly along S1. Indentation of the ventral CSF space,  but no central canal stenosis.  The extruded component appears to have some  focal contact with the emerging left S1 nerve root in the lateral recess (axial  T1 image 2; axial T2 image 4). Facets: No significant arthropathy. Right Foramen: No stenosis. Left Foramen: No stenosis.     Visible Retroperitoneum And Abdomen: No definite masses. The uterus appears  enlarged on the localizer images, but not closely imaged on this examination. Also seen on dedicated pelvic ultrasound from 6/19/13.     IMPRESSION:     Multilevel relatively mild degenerative disc disease.     Potential sources of left-sided radiculopathy include:     -L5/S1 central disc protrusion with tiny extruded component may contact the  emerging left S1 nerve root in the lateral recess. -Mild central canal stenosis L4/L5 with encroachment on both L5 nerve roots,  slightly worse on the left than the right. A left far lateral disc bulge at  this level may contact the left L4 nerve root. -L3/L4 disc bulge may contact but does not appear to impinge the left L3 nerve  root. -Please correlate with distribution of patient's radiculopathy.     Facet DJD L3/4 and L4/5. Health maintenance reviewed  New concern: loratadine not helping with nasal congestion, rhinorrhea and eye itching over the past several weeks. ROS: denies headaches    Wt Readings from Last 3 Encounters:   04/25/17 197 lb 3.2 oz (89.4 kg)   04/06/17 196 lb (88.9 kg)   04/07/17 196 lb (88.9 kg)     BP Readings from Last 3 Encounters:   04/25/17 130/88   04/06/17 128/81   12/16/16 (!) 142/92     PMH: reviewed medications and allergy lists and medical and family history. OBJECTIVE:  Awake and alert in no acute distress  HEENT: nares patent with erythema, boggy, clear secretions bilaterally. TMs retracted bilaterally, pharynx without erythema, neck supple with shotty lymphadenopathy. No tenderness to palpation over sinus passages bilaterally  Lungs clear throughout  S1 S2 RRR without ectopy or murmur auscultated.   Musculoskeletal: TTP paraspinals greater on right than left lumbar positive straight leg tests bilaterally  No gait abnormalities no antalgic gait  Visit Vitals    /88 (BP 1 Location: Left arm, BP Patient Position: Sitting)    Pulse 77    Temp 98 °F (36.7 °C) (Oral)    Resp 16    Ht 5' 5\" (1.651 m)    Wt 197 lb 3.2 oz (89.4 kg)    SpO2 97%    BMI 32.82 kg/m2     Carmella Alvarado was seen today for back pain and other. Diagnoses and all orders for this visit:    Discogenic syndrome, lumbar  -     REFERRAL TO ORTHOPEDICS  -     traMADol (ULTRAM) 50 mg tablet; Take 1 Tab by mouth every six (6) hours as needed for Pain. Max Daily Amount: 200 mg. Indications: Pain    Encounter for immunization  -     TETANUS, DIPHTHERIA TOXOIDS AND ACELLULAR PERTUSSIS VACCINE (TDAP), IN INDIVIDS. >=7, IM    Chronic bilateral low back pain with bilateral sciatica  -     REFERRAL TO ORTHOPEDICS  -     gabapentin (NEURONTIN) 300 mg capsule; Take one pill nightly for three nights by mouth and if tolerated increase to twice daily by mouth  -     traMADol (ULTRAM) 50 mg tablet; Take 1 Tab by mouth every six (6) hours as needed for Pain. Max Daily Amount: 200 mg. Indications: Pain    Need for hepatitis C screening test  -     HEPATITIS C AB; Future  -     HEPATITIS C AB    Seasonal allergic rhinitis due to pollen  -     cetirizine (ZYRTEC) 10 mg tablet; Take 1 Tab by mouth nightly as needed for Allergies. Reviewed risks and benefits and common side effects of new medication  General comfort measures  Portion control and exercise healthy eating general guidelines reviewed with patient to get closer to normal BMI  Health maintenance reviewed  Reviewed risks and benefits and common side effects of immunization reviewed. Patient verbalizes understanding. I have discussed the diagnosis with the patient and the intended plan as seen in the above orders. The patient has received an after-visit summary and questions were answered concerning future plans.   I have discussed medication side effects and warnings with the patient as well. Follow-up Disposition:  Return if symptoms worsen or fail to improve, for make WWE . Patient did not complete PHQ 9 will revisit at next appointment.

## 2017-04-26 PROBLEM — M54.41 CHRONIC BILATERAL LOW BACK PAIN WITH BILATERAL SCIATICA: Status: ACTIVE | Noted: 2017-04-26

## 2017-04-26 PROBLEM — M54.42 CHRONIC BILATERAL LOW BACK PAIN WITH BILATERAL SCIATICA: Status: ACTIVE | Noted: 2017-04-26

## 2017-04-26 PROBLEM — J30.1 SEASONAL ALLERGIC RHINITIS DUE TO POLLEN: Status: ACTIVE | Noted: 2017-04-26

## 2017-04-26 PROBLEM — G89.29 CHRONIC BILATERAL LOW BACK PAIN WITH BILATERAL SCIATICA: Status: ACTIVE | Noted: 2017-04-26

## 2017-04-27 LAB — HCV AB SER IA-ACNC: NORMAL

## 2017-06-23 ENCOUNTER — OFFICE VISIT (OUTPATIENT)
Dept: FAMILY MEDICINE CLINIC | Age: 52
End: 2017-06-23

## 2017-06-23 VITALS
HEIGHT: 65 IN | WEIGHT: 201.2 LBS | OXYGEN SATURATION: 99 % | RESPIRATION RATE: 16 BRPM | HEART RATE: 87 BPM | TEMPERATURE: 97.9 F | BODY MASS INDEX: 33.52 KG/M2 | SYSTOLIC BLOOD PRESSURE: 120 MMHG | DIASTOLIC BLOOD PRESSURE: 82 MMHG

## 2017-06-23 DIAGNOSIS — M54.2 CERVICAL PAIN (NECK): Primary | ICD-10-CM

## 2017-06-23 NOTE — MR AVS SNAPSHOT
Visit Information Date & Time Provider Department Dept. Phone Encounter #  
 6/23/2017  7:30 AM Jordan Chandler NP [facility] [phone] [id number] Follow-up Instructions Return if symptoms worsen or fail to improve. Your Appointments 6/27/2017  3:00 PM  
New Patient with Floy Reasons, MD  
[address] [facility], [address] [facility] [address] [facility]) Appt Note: low back pain, discogenic disease with radiculopathy/ LAST SEEN IN 2013/ REF BY Jordan Chandler NP /*ADVISED PT TO COME EARLY W. PHOTO ID & INS. CARD, CURRENT MEDICATION LIST & DOSAGE TO THE [facility] LOCATION  
 [address] [facility] [id number] [phone]  
  
   
 [address] [facility] [address] Upcoming Health Maintenance Date Due INFLUENZA AGE 9 TO ADULT 8/1/2017 PAP AKA CERVICAL CYTOLOGY 7/1/2018 BREAST CANCER SCRN MAMMOGRAM 6/23/2019 COLONOSCOPY 6/21/2021 DTaP/Tdap/Td series (2 - Td) 4/25/2027 Allergies as of 6/23/2017  Review Complete On: 6/23/2017 By: Jordan Chandler NP Severity Noted Reaction Type Reactions Levaquin [Levofloxacin]  03/01/2010   Topical Rash, Itching Current Immunizations  Reviewed on 12/16/2016 Name Date Influenza Vaccine 11/16/2016, 11/15/2013 Influenza Vaccine (Quad) PF 8/25/2015, 9/29/2014 Influenza Vaccine Split 10/17/2012 PPD 7/26/2011 TB Skin Test (PPD) Intradermal 12/16/2016, 3/16/2015  2:25 PM  
 Tdap 4/25/2017 Not reviewed this visit You Were Diagnosed With   
  
 Codes Comments Cervical pain (neck)    -  Primary ICD-10-CM: M54.2 ICD-9-CM: 723.1 Vitals BP Pulse Temp Resp Height(growth percentile) Weight(growth percentile) 120/82 (BP 1 Location: Left arm, BP Patient Position: Sitting) 87 97.9 °F (36.6 °C) (Oral) 16 5' 5\" (1.651 m) 201 lb 3.2 oz (91.3 kg) LMP SpO2 BMI OB Status Smoking Status 01/30/2016 99% 33.48 kg/m2 Hysterectomy Never Smoker Vitals History BMI and BSA Data Body Mass Index Body Surface Area  
 33.48 kg/m 2 2.05 m 2 Preferred Pharmacy Pharmacy Name Phone [facility] [address], [address] [phone] Your Updated Medication List  
  
   
This list is accurate as of: 6/23/17  8:12 AM.  Always use your most recent med list.  
  
  
  
  
 amphetamine-dextroamphetamine XR 20 mg XR capsule Commonly known as:  ADDERALL XR Take 40 mg by mouth daily. aspirin delayed-release 81 mg tablet Take  by mouth daily. cetirizine 10 mg tablet Commonly known as:  ZYRTEC Take 1 Tab by mouth nightly as needed for Allergies. citalopram 10 mg tablet Commonly known as:  [place] Take  by mouth daily. gabapentin 300 mg capsule Commonly known as:  NEURONTIN Take one pill nightly for three nights by mouth and if tolerated increase to twice daily by mouth  
  
 polyethylene glycol 17 gram/dose powder Commonly known as:  MIRALAX  
  
 simvastatin 20 mg tablet Commonly known as:  ZOCOR Take  by mouth nightly. traMADol 50 mg tablet Commonly known as:  ULTRAM  
Take 1 Tab by mouth every six (6) hours as needed for Pain. Max Daily Amount: 200 mg. Indications: Pain Follow-up Instructions Return if symptoms worsen or fail to improve. Patient Instructions Neck Pain: Care Instructions Your Care Instructions You can have neck pain anywhere from the bottom of your head to the top of your shoulders. It can spread to the upper back or arms. Injuries, painting a ceiling, sleeping with your neck twisted, staying in one position for too long, and many other activities can cause neck pain. Most neck pain gets better with home care. Your doctor may recommend medicine to relieve pain or relax your muscles.  He or she may suggest exercise and physical therapy to increase flexibility and relieve stress. You may need to wear a special (cervical) collar to support your neck for a day or two. Follow-up care is a key part of your treatment and safety. Be sure to make and go to all appointments, and call your doctor if you are having problems. It's also a good idea to know your test results and keep a list of the medicines you take. How can you care for yourself at home? · Try using a heating pad on a low or medium setting for 15 to 20 minutes every 2 or 3 hours. Try a warm shower in place of one session with the heating pad. · You can also try an ice pack for 10 to 15 minutes every 2 to 3 hours. Put a thin cloth between the ice and your skin. · Take pain medicines exactly as directed. ¨ If the doctor gave you a prescription medicine for pain, take it as prescribed. ¨ If you are not taking a prescription pain medicine, ask your doctor if you can take an over-the-counter medicine. · If your doctor recommends a cervical collar, wear it exactly as directed. When should you call for help? Call your doctor now or seek immediate medical care if: 
· You have new or worsening numbness in your arms, buttocks or legs. · You have new or worsening weakness in your arms or legs. (This could make it hard to stand up.) · You lose control of your bladder or bowels. Watch closely for changes in your health, and be sure to contact your doctor if: 
· Your neck pain is getting worse. · You are not getting better after 1 week. · You do not get better as expected. Where can you learn more? Go to http://abbe-katrina.info/. Enter [id number] in the search box to learn more about \"Neck Pain: Care Instructions. \" Current as of: March 21, 2017 Content Version: 11.3 © 3906-4297 [employer].  Care instructions adapted under license by [employer] (which disclaims liability or warranty for this information). If you have questions about a medical condition or this instruction, always ask your healthcare professional. [address] any warranty or liability for your use of this information. Introducing [facility]! Dear Mercedez Davis: Thank you for requesting a [email or web address] account. Our records indicate that you already have an active [email or web address] account. You can access your account anytime at [email or web address] Did you know that you can access your hospital and ER discharge instructions at any time in [email or web address]? You can also review all of your test results from your hospital stay or ER visit. Additional Information If you have questions, please visit the Frequently Asked Questions section of the [email or web address] website at [email or web address] Remember, [email or web address] is NOT to be used for urgent needs. For medical emergencies, dial 911. Now available from your iPhone and Android! Please provide this summary of care documentation to your next provider. Your primary care clinician is listed as Leora Cervantes. If you have any questions after today's visit, please call [phone].

## 2017-06-23 NOTE — PROGRESS NOTES
1. Have you been to the ER, urgent care clinic since your last visit? Hospitalized since your last visit? NO    2. Have you seen or consulted any other health care providers outside of the [address] [place] [address] since your last visit? Include any pap smears or colon screening. NO    3. Vaccines? No    4.  Rx updated yes

## 2017-06-23 NOTE — PATIENT INSTRUCTIONS
Neck Pain: Care Instructions  Your Care Instructions  You can have neck pain anywhere from the bottom of your head to the top of your shoulders. It can spread to the upper back or arms. Injuries, painting a ceiling, sleeping with your neck twisted, staying in one position for too long, and many other activities can cause neck pain. Most neck pain gets better with home care. Your doctor may recommend medicine to relieve pain or relax your muscles. He or she may suggest exercise and physical therapy to increase flexibility and relieve stress. You may need to wear a special (cervical) collar to support your neck for a day or two. Follow-up care is a key part of your treatment and safety. Be sure to make and go to all appointments, and call your doctor if you are having problems. It's also a good idea to know your test results and keep a list of the medicines you take. How can you care for yourself at home? · Try using a heating pad on a low or medium setting for 15 to 20 minutes every 2 or 3 hours. Try a warm shower in place of one session with the heating pad. · You can also try an ice pack for 10 to 15 minutes every 2 to 3 hours. Put a thin cloth between the ice and your skin. · Take pain medicines exactly as directed. ¨ If the doctor gave you a prescription medicine for pain, take it as prescribed. ¨ If you are not taking a prescription pain medicine, ask your doctor if you can take an over-the-counter medicine. · If your doctor recommends a cervical collar, wear it exactly as directed. When should you call for help? Call your doctor now or seek immediate medical care if:  · You have new or worsening numbness in your arms, buttocks or legs. · You have new or worsening weakness in your arms or legs. (This could make it hard to stand up.)  · You lose control of your bladder or bowels.   Watch closely for changes in your health, and be sure to contact your doctor if:  · Your neck pain is getting worse.  · You are not getting better after 1 week. · You do not get better as expected. Where can you learn more? Go to http://abbe-katrina.info/. Enter [id number] in the search box to learn more about \"Neck Pain: Care Instructions. \"  Current as of: March 21, 2017  Content Version: 11.3  © 9516-1965 "[employer]. Care instructions adapted under license by [employer] (which disclaims liability or warranty for this information). If you have questions about a medical condition or this instruction, always ask your healthcare professional. [place] [address] any warranty or liability for your use of this information.

## 2017-06-23 NOTE — PROGRESS NOTES
HISTORY OF PRESENT ILLNESS    Mercedez Davis is a 47y.o. year old female here today for:  Cervical neck pain     Onset pain June 2, 2017  Context has documented DJD cervical spine treated at the [facility] ([place]) 2013  Site neck  Duration  Acute on Chronic    Type of pain or sensation aching pain some days and some days patient reports pulsating type pain also some pins and needle like sensations, one day like a knot sharp and shooting  Associated symptoms sometimes bilaterally  Severity 3-4/10 now but it has been from a 4-8/10 since onset  Exacerbating factors tried tramadol (previous prescription from another provider, baclofen from another provider) she reports no real help. She tried naprosyn no effect on pain. She has started gabapentin 300 mg hs (6-16-17) but unable to tolerate morning dose excessive sleepiness  Relieving factors nothing to this point per patient. ROS:  Denies numbness or tingling upper extremities, +pain in neck with turning toward right   Imaging: no imaging found for cervical spine in chart  New concern; thumb pain which has resolved since shutting car door on tip thumb on 6-5-17. No complaints of pain now. Current Outpatient Prescriptions   Medication Sig Dispense Refill    gabapentin (NEURONTIN) 300 mg capsule Take one pill nightly for three nights by mouth and if tolerated increase to twice daily by mouth 60 Cap 1    traMADol (ULTRAM) 50 mg tablet Take 1 Tab by mouth every six (6) hours as needed for Pain. Max Daily Amount: 200 mg. Indications: Pain 40 Tab 0    cetirizine (ZYRTEC) 10 mg tablet Take 1 Tab by mouth nightly as needed for Allergies. 30 Tab 3    polyethylene glycol (MIRALAX) 17 gram/dose powder   0    amphetamine-dextroamphetamine XR (ADDERALL XR) 20 mg XR capsule Take 40 mg by mouth daily. 0    citalopram (CELEXA) 10 mg tablet Take  by mouth daily.  aspirin delayed-release 81 mg tablet Take  by mouth daily.       simvastatin (ZOCOR) 20 mg tablet Take by mouth nightly. Patient Active Problem List   Diagnosis Code    DDD (degenerative disc disease) [id number]    DJD (degenerative joint disease) of cervical spine M47.812    HLD (hyperlipidemia) E78.5    ADD (attention deficit disorder) F98.8    PTSD (post-traumatic stress disorder) F43.10    Depression F32.9    Chronic bilateral low back pain with bilateral sciatica M54.42, M54.41, G89.29    Seasonal allergic rhinitis due to pollen J30.1    BMI 32.0-32.9,adult Z68.32     Reviewed past medical, family and social history      Wt Readings from Last 3 Encounters:   06/23/17 201 lb 3.2 oz (91.3 kg)   04/25/17 197 lb 3.2 oz (89.4 kg)   04/06/17 196 lb (88.9 kg)     BP Readings from Last 3 Encounters:   06/23/17 120/82   04/25/17 130/88   04/06/17 128/81     OBJECTIVE:  Awake and alert in no acute distress  Lungs clear throughout  S1 S2 RRR without ectopy or murmur auscultated. Musculoskeletal:   Cervical spine inspection no erythema, no edema  Palpation no warmth to palpation TTP trapezius right and left  ROM no limited ROM to head or neck pain with turning toward right with neck  Shoulder shrug with complaint of pain bilaterally in neck  Motor strength +5/5 head, neck and upper extremities bilaterally  Right thumb inspection no erythema no edema  Palpation no TTP MCP, PIP, DIP  ROM finger to finger apposition without difficulty  Motor strength +5/5 hand grasp    Visit Vitals    /82 (BP 1 Location: Left arm, BP Patient Position: Sitting)    Pulse 87    Temp 97.9 °F (36.6 °C) (Oral)    Resp 16    Ht 5' 5\" (1.651 m)    Wt 201 lb 3.2 oz (91.3 kg)    SpO2 99%    BMI 33.48 kg/m2     [facility] was [facility] today for neck pain and finger pain.     Diagnoses and all orders for this visit:    Cervical pain (neck)      Portion control and exercise healthy eating general guidelines reviewed with patient to get closer to normal BMI  Make appointment for follow up for BMI and weight loss strategies  Patient has appointment for spine center next week encouraged her to keep appointment   I have discussed the diagnosis with the patient and the intended plan as seen in the above orders. The patient has received an after-visit summary and questions were answered concerning future plans. I have discussed medication side effects and warnings with the patient as well. Follow-up Disposition:  Return if symptoms worsen or fail to improve.

## 2017-08-02 ENCOUNTER — OFFICE VISIT (OUTPATIENT)
Dept: ORTHOPEDIC SURGERY | Age: 52
End: 2017-08-02

## 2017-08-02 VITALS
WEIGHT: 200 LBS | TEMPERATURE: 97.9 F | HEART RATE: 86 BPM | DIASTOLIC BLOOD PRESSURE: 92 MMHG | RESPIRATION RATE: 16 BRPM | SYSTOLIC BLOOD PRESSURE: 140 MMHG | HEIGHT: 65 IN | BODY MASS INDEX: 33.32 KG/M2

## 2017-08-02 DIAGNOSIS — M54.16 LUMBAR RADICULAR PAIN: Primary | ICD-10-CM

## 2017-08-02 DIAGNOSIS — M54.50 LUMBAR SPINE PAIN: ICD-10-CM

## 2017-08-02 RX ORDER — KETOROLAC TROMETHAMINE 15 MG/ML
60 INJECTION, SOLUTION INTRAMUSCULAR; INTRAVENOUS ONCE
Qty: 1 VIAL | Refills: 0
Start: 2017-08-02 | End: 2017-08-02

## 2017-08-02 RX ORDER — METHYLPREDNISOLONE 4 MG/1
TABLET ORAL
Qty: 21 TAB | Refills: 0 | Status: SHIPPED | OUTPATIENT
Start: 2017-08-02 | End: 2017-09-27 | Stop reason: ALTCHOICE

## 2017-08-02 NOTE — PROGRESS NOTES
VORB ENTERED PER DR. Jairo Ruiz AS DOCUMENTED ON BLUE SHEET: TORADOL 60 MG IM X 1 NOW; MRI LUMBAR SPINE WO CONTRAST FOR INCREASED LOW BACK PAIN; MDP. After obtaining consent, and per orders of Dr. Jairo Ruiz, injection of TORADOL 60 MG IM X 1 given by Radha Tracy LPN. Patient instructed to report any adverse reaction to me immediately. PT TOLERATED WELL AND WITHOUT INCIDENT.

## 2017-08-02 NOTE — PROGRESS NOTES
[address]  [address], [address]  [place]  Phone: [phone]  Fax: [phone]        Meseret Lawrence  : 1965  PCP: Pallavi Briceño NP      NEW PATIENT      ASSESSMENT AND PLAN     Diagnoses and all orders for this visit:    1. Lumbar radicular pain    2. Lumbar spine pain  -     [81696] LS Spine 4V  -     MRI LUMB SPINE WO CONT; Future  -     KETOROLAC TROMETHAMINE INJ  -     THER/PROPH/DIAG INJECTION, SUBCUT/IM    Other orders  -     ketorolac (TORADOL) 15 mg/mL soln injection; 4 mL by IntraMUSCular route once for 1 dose. -     methylPREDNISolone (MEDROL DOSEPACK) 4 mg tablet; Per dose pack instructions    1. Lumbar spine MRI. 2. Restart QHS Gabapentin. 3. Rx for MDP. No antiinflammatories while on MDP. Tylenol okay to take. 4. Given home exercises. Follow-up Disposition:  Return for MRI/CT f/u. CHIEF COMPLAINT  Garland Garcias is seen today in consultation as self referral for complaints of back pain since . HISTORY OF PRESENT ILLNESS  Garland Garcias is a 47 y.o. female. Pt has seen Dr. Jose Rafael Palma in the past. She has increasing LBP radiating into RLE. Travis Chow Pt denies any specific incident or injury that caused their pain. She is unsure how her pain started. Pt states that her pain is felt constantly but will increase in severity during the day. She feels as if her flares of increased pain is occurring more frequently. Her pain mainly stays in her back but she has had intermittent RLE pain and paresthesia. She has increased pain with standing and walking but laying down will reproduce her most severe pain. She has difficulty rising from bed in the morning. Her pain will bother her with prolonged sitting as well. Pt feels as if her pain has been increasing recently. She denies any saddle paresthesia. She has had her constipation since her bowel obstruction repair.   She is not taking Gabapentin, she was unable to tolerate Gabapentin during the day due to somnolence. She is not taking any antiinflammatories during the day. Denies persistent fevers, chills, weight changes, neurogenic bowel or bladder symptoms. Pt denies recent ED visits or hospitalizations. She has benefit from Toradol injections, requesting one today. Pt denies any recent GI ulcers, bleeds or renal dysfucntion. PMHx of bowel obstruction repair x2, last one in 2016. She has urinary urgency and chronic constipation. Previous MRI 2013 shows left sided disc protrusions. Denies LLE symptoms. Has been through PT. Pain Assessment  8/2/2017   Location of Pain Back   Location Modifiers Posterior   Severity of Pain 4   Quality of Pain Dull;Aching;Burning   Duration of Pain Persistent   Frequency of Pain Constant   Aggravating Factors Bending   Aggravating Factors Comment sitting, lying   Relieving Factors NSAID   Relieving Factors Comment stated past meds (baclofen and percocet)                   XR Results:    Results from Hospital Encounter encounter on 04/07/17   XR SI JTS MIN 3 V   Narrative Sacroiliac joints      HISTORY: Sacroiliac joint pain 5-6 days right greater than left, reportedly no  trauma. COMPARISON: [facility] pelvic CT 2015. FINDINGS: 3 views were obtained including AP view of the sacroiliac joints and  bilateral oblique views. The sacroiliac joints appear preserved. No erosive change identified. No  sclerosis evident. No fracture or malalignment. Impression IMPRESSION:     No significant diagnostic abnormality        MRI Results (maximum last 3): Results from [place] encounter on 06/27/13   MRI LUMB SPINE WO CONT   Narrative PROCEDURE: MRI of the lumbar spine without contrast.    CPT CODE: 05536    INDICATIONS: Low back pain, left leg pain. 722. 52. Left-sided radiculopathy    COMPARISONS: None.     TECHNIQUE: T1 weighted, T2 FSE, and FSE inversion recovery sagittal images are  supplemented by T2 weighted and T1 weighted axial images. FINDINGS:    Alignment is normal.  Vertebral body heights are normal.  No acute or chronic fractures. Disc heights are maintained throughout. Desiccative changes consistent with  mild degenerative change L3/4, L4/5, and L5/S1. Marrow signal is not pathologic. No vertebral body edema. No mass. Conus terminates at the lower L1 level with normal signal.    Correlation of axial and sagittal data through the disc levels:    L1/2: Disc And Central Canal: No significant disc pathology or central stenosis. Facets: No significant arthropathy. Right Foramen: No stenosis. Left Foramen: No stenosis. L2/3: Disc And Central Canal: No significant disc pathology or central stenosis. Facets: Mild lateral facet arthropathy  Right Foramen: No stenosis. Left Foramen: No stenosis. L3/4: Disc And Central Canal: Mild posterior disc bulge slightly more focal to  the left of midline extending into the left foramen and left lateral. (Axial T2  series 5, image 16). There is some contact with the exiting left L3 nerve root  far lateral, but no definite impingement. No significant central canal  stenosis. Facets: Mild bilateral facet arthropathy  Right Foramen: No stenosis. Left Foramen: No stenosis. L4/5:  Disc And Central Canal: Posterior disc bulge with slightly more focal  central disc protrusion. Indentation of the ventral CSF space. Combines with  facet arthropathy and mild ligamentum flavum thickening to cause mild central  canal stenosis. Some encroachment on the L5 nerve roots in the lateral recesses  left worse than right (axial T2 images 9, 10). Far lateral the disc bulge may  contact but not definitely compress the left L4 nerve root. Facets: Mild to moderate bilateral facet arthropathy  Right Foramen: No stenosis. Left Foramen: No stenosis.     L5/S1: Disc And Central Canal: Central disc protrusion/extrusion with tiny  component extending inferiorly along S1. Indentation of the ventral CSF space,  but no central canal stenosis. The extruded component appears to have some  focal contact with the emerging left S1 nerve root in the lateral recess (axial  T1 image 2; axial T2 image 4). Facets: No significant arthropathy. Right Foramen: No stenosis. Left Foramen: No stenosis. Visible Retroperitoneum And Abdomen: No definite masses. The uterus appears  enlarged on the localizer images, but not closely imaged on this examination. Also seen on dedicated pelvic ultrasound from 6/19/13. Impression IMPRESSION:    Multilevel relatively mild degenerative disc disease. Potential sources of left-sided radiculopathy include:    -L5/S1 central disc protrusion with tiny extruded component may contact the  emerging left S1 nerve root in the lateral recess. -Mild central canal stenosis L4/L5 with encroachment on both L5 nerve roots,  slightly worse on the left than the right. A left far lateral disc bulge at  this level may contact the left L4 nerve root. -L3/L4 disc bulge may contact but does not appear to impinge the left L3 nerve  root. -Please correlate with distribution of patient's radiculopathy. Facet DJD L3/4 and L4/5.                       PAST MEDICAL HISTORY   Past Medical History:   Diagnosis Date    Anemia 3/1/2010    Ankle fracture 5/09    right ankle    Cervical spine pain     Degenerative cervical disc     Seasonal allergic rhinitis     Thyroid disease        Past Surgical History:   Procedure Laterality Date    HX GI  1990\", 2016    bowl obstruction repair x2    HX GYN  1990's    etopic pregnancy    HX SUSANNA AND BSO  12/2015    HX TUBAL LIGATION         MEDICATIONS      Current Outpatient Prescriptions   Medication Sig Dispense Refill    methylPREDNISolone (MEDROL DOSEPACK) 4 mg tablet Per dose pack instructions 21 Tab 0    gabapentin (NEURONTIN) 300 mg capsule Take one pill nightly for three nights by mouth and if tolerated increase to twice daily by mouth 60 Cap 1    cetirizine (ZYRTEC) 10 mg tablet Take 1 Tab by mouth nightly as needed for Allergies. 30 Tab 3    amphetamine-dextroamphetamine XR (ADDERALL XR) 20 mg XR capsule Take 40 mg by mouth daily. 0    citalopram (CELEXA) 10 mg tablet Take  by mouth daily.  traMADol (ULTRAM) 50 mg tablet Take 1 Tab by mouth every six (6) hours as needed for Pain. Max Daily Amount: 200 mg. Indications: Pain 40 Tab 0       ALLERGIES    Allergies   Allergen Reactions    Levaquin [Levofloxacin] Rash and Itching          SOCIAL HISTORY    Social History     Social History    Marital status:      Spouse name: N/A    Number of children: N/A    Years of education: N/A     Occupational History    Not on file. Social History Main Topics    Smoking status: Never Smoker    Smokeless tobacco: Never Used    Alcohol use Yes    Drug use: No    Sexual activity: No     Other Topics Concern    Not on file     Social History Narrative       FAMILY HISTORY    Family History   Problem Relation Age of Onset    Cancer Father      lung cancer    Diabetes Mother     Hypertension Mother     Diabetes Maternal Aunt     Diabetes Maternal Grandmother          REVIEW OF SYSTEMS  Review of Systems   Constitutional: Negative for chills, fever and weight loss. Respiratory: Negative for shortness of breath. Cardiovascular: Negative for chest pain. Gastrointestinal: Negative for constipation. Negative for fecal incontinence   Genitourinary: Negative for dysuria. Negative for urinary incontinence   Musculoskeletal:        Per HPI   Skin: Negative for rash. Neurological: Positive for tingling (intermittent). Negative for dizziness, tremors, focal weakness and headaches. Endo/Heme/Allergies: Does not bruise/bleed easily. Psychiatric/Behavioral: The patient does not have insomnia.           PHYSICAL EXAMINATION  Visit Vitals    BP (!) 140/92    Pulse 86    Temp 97.9 °F (36.6 °C)    Resp 16    Ht 5' 5\" (1.651 m)    Wt 200 lb (90.7 kg)    LMP 01/30/2016    BMI 33.28 kg/m2          Accompanied by self. Constitutional:  Well developed, well nourished, in no acute distress. Psychiatric: Affect and mood are appropriate. Integumentary: No rashes or abrasions noted on exposed areas. Cardiovascular/Peripheral Vascular: Intact l pulses. No peripheral edema is noted. Lymphatic:  No evidence of lymphedema. No cervical lymphadenopathy. SPINE/MUSCULOSKELETAL EXAM    Lumbar spine:  No rash, ecchymosis, or gross obliquity. No fasciculations. No focal atrophy is noted. Limited ROM in all planes of lumbar spine. Tenderness to palpation B/L L4-5 and L5-S1. No tenderness to palpation at the sciatic notch. SI joints non-tender. Trochanters non tender. Sensation grossly intact to light touch. MOTOR:       Hip Flex  Quads Hamstrings Ankle DF EHL Ankle PF   Right +4/5 +4/5 +4/5 +4/5 +4/5 +4/5   Left +4/5 +4/5 +4/5 +4/5 +4/5 +4/5     Straight Leg raise negative. Mild difficulty with tandem gait. Heel walk intact. Toe rise intact    Ambulation without assistive device. FWB. DTRs intact. RADIOGRAPHS  Lumbar spine xray films reviewed:  1) Degenerative changes at L4-5 and L5-S1. 2) No instability     Written by Eloisa Thomas, as dictated by Stephanie Smiley MD.    I, Dr. Stephanie Smiley MD, confirm that all documentation is accurate. Ms. Sarah Grant may have a reminder for a \"due or due soon\" health maintenance. I have asked that she contact her primary care provider for follow-up on this health maintenance.

## 2017-08-02 NOTE — PATIENT INSTRUCTIONS

## 2017-09-19 ENCOUNTER — OFFICE VISIT (OUTPATIENT)
Dept: FAMILY MEDICINE CLINIC | Age: 52
End: 2017-09-19

## 2017-09-19 DIAGNOSIS — M54.41 CHRONIC BILATERAL LOW BACK PAIN WITH BILATERAL SCIATICA: ICD-10-CM

## 2017-09-19 DIAGNOSIS — M51.26 DISCOGENIC SYNDROME, LUMBAR: ICD-10-CM

## 2017-09-19 DIAGNOSIS — M54.42 CHRONIC BILATERAL LOW BACK PAIN WITH BILATERAL SCIATICA: ICD-10-CM

## 2017-09-19 DIAGNOSIS — G89.29 CHRONIC BILATERAL LOW BACK PAIN WITH BILATERAL SCIATICA: ICD-10-CM

## 2017-09-19 DIAGNOSIS — R94.31 EKG ABNORMALITIES: ICD-10-CM

## 2017-09-19 DIAGNOSIS — M54.6 ACUTE RIGHT-SIDED THORACIC BACK PAIN: Primary | ICD-10-CM

## 2017-09-19 DIAGNOSIS — M47.816 FACET ARTHROPATHY, LUMBAR: ICD-10-CM

## 2017-09-19 LAB
BILIRUB UR QL STRIP: NEGATIVE
GLUCOSE UR-MCNC: NEGATIVE MG/DL
KETONES P FAST UR STRIP-MCNC: NEGATIVE MG/DL
PH UR STRIP: 7.5 [PH] (ref 4.6–8)
PROT UR QL STRIP: NEGATIVE MG/DL
SP GR UR STRIP: 1.01 (ref 1–1.03)
UA UROBILINOGEN AMB POC: NORMAL (ref 0.2–1)
URINALYSIS CLARITY POC: CLEAR
URINALYSIS COLOR POC: YELLOW
URINE BLOOD POC: NEGATIVE
URINE LEUKOCYTES POC: NEGATIVE
URINE NITRITES POC: NEGATIVE

## 2017-09-19 RX ORDER — GABAPENTIN 300 MG/1
CAPSULE ORAL
Qty: 180 CAP | Refills: 1 | Status: SHIPPED | OUTPATIENT
Start: 2017-09-19 | End: 2018-08-21

## 2017-09-19 RX ORDER — GABAPENTIN 300 MG/1
CAPSULE ORAL
Qty: 60 CAP | Refills: 1 | Status: CANCELLED | OUTPATIENT
Start: 2017-09-19

## 2017-09-19 RX ORDER — TRAMADOL HYDROCHLORIDE 50 MG/1
50 TABLET ORAL
Qty: 40 TAB | Refills: 0 | OUTPATIENT
Start: 2017-09-19

## 2017-09-19 NOTE — PATIENT INSTRUCTIONS
Back Pain: Care Instructions  Your Care Instructions    Back pain has many possible causes. It is often related to problems with muscles and ligaments of the back. It may also be related to problems with the nerves, discs, or bones of the back. Moving, lifting, standing, sitting, or sleeping in an awkward way can strain the back. Sometimes you don't notice the injury until later. Arthritis is another common cause of back pain. Although it may hurt a lot, back pain usually improves on its own within several weeks. Most people recover in 12 weeks or less. Using good home treatment and being careful not to stress your back can help you feel better sooner. Follow-up care is a key part of your treatment and safety. Be sure to make and go to all appointments, and call your doctor if you are having problems. Its also a good idea to know your test results and keep a list of the medicines you take. How can you care for yourself at home? · Sit or lie in positions that are most comfortable and reduce your pain. Try one of these positions when you lie down:  ¨ Lie on your back with your knees bent and supported by large pillows. ¨ Lie on the floor with your legs on the seat of a sofa or chair. Mat Taylor on your side with your knees and hips bent and a pillow between your legs. ¨ Lie on your stomach if it does not make pain worse. · Do not sit up in bed, and avoid soft couches and twisted positions. Bed rest can help relieve pain at first, but it delays healing. Avoid bed rest after the first day of back pain. · Change positions every 30 minutes. If you must sit for long periods of time, take breaks from sitting. Get up and walk around, or lie in a comfortable position. · Try using a heating pad on a low or medium setting for 15 to 20 minutes every 2 or 3 hours. Try a warm shower in place of one session with the heating pad. · You can also try an ice pack for 10 to 15 minutes every 2 to 3 hours.  Put a thin cloth between the ice pack and your skin. · Take pain medicines exactly as directed. ¨ If the doctor gave you a prescription medicine for pain, take it as prescribed. ¨ If you are not taking a prescription pain medicine, ask your doctor if you can take an over-the-counter medicine. · Take short walks several times a day. You can start with 5 to 10 minutes, 3 or 4 times a day, and work up to longer walks. Walk on level surfaces and avoid hills and stairs until your back is better. · Return to work and other activities as soon as you can. Continued rest without activity is usually not good for your back. · To prevent future back pain, do exercises to stretch and strengthen your back and stomach. Learn how to use good posture, safe lifting techniques, and proper body mechanics. When should you call for help? Call your doctor now or seek immediate medical care if:  · You have new or worsening numbness in your legs. · You have new or worsening weakness in your legs. (This could make it hard to stand up.)  · You lose control of your bladder or bowels. Watch closely for changes in your health, and be sure to contact your doctor if:  · Your pain gets worse. · You are not getting better after 2 weeks. Where can you learn more? Go to http://abbe-katrina.info/. Enter B549 in the search box to learn more about \"Back Pain: Care Instructions. \"  Current as of: March 21, 2017  Content Version: 11.3  © 9497-6559 [employer]. Care instructions adapted under license by [employer] (which disclaims liability or warranty for this information). If you have questions about a medical condition or this instruction, always ask your healthcare professional. [address] any warranty or liability for your use of this information.        Body Mass Index: Care Instructions  Your Care Instructions    Body mass index (BMI) can help you see if your weight is raising your risk for health problems. It uses a formula to compare how much you weigh with how tall you are. · A BMI lower than 18.5 is considered underweight. · A BMI between 18.5 and 24.9 is considered healthy. · A BMI between 25 and 29.9 is considered overweight. A BMI of 30 or higher is considered obese. If your BMI is in the normal range, it means that you have a lower risk for weight-related health problems. If your BMI is in the overweight or obese range, you may be at increased risk for weight-related health problems, such as high blood pressure, heart disease, stroke, arthritis or joint pain, and diabetes. If your BMI is in the underweight range, you may be at increased risk for health problems such as fatigue, lower protection (immunity) against illness, muscle loss, bone loss, hair loss, and hormone problems. BMI is just one measure of your risk for weight-related health problems. You may be at higher risk for health problems if you are not active, you eat an unhealthy diet, or you drink too much alcohol or use tobacco products. Follow-up care is a key part of your treatment and safety. Be sure to make and go to all appointments, and call your doctor if you are having problems. It's also a good idea to know your test results and keep a list of the medicines you take. How can you care for yourself at home? · Practice healthy eating habits. This includes eating plenty of fruits, vegetables, whole grains, lean protein, and low-fat dairy. · If your doctor recommends it, get more exercise. Walking is a good choice. Bit by bit, increase the amount you walk every day. Try for at least 30 minutes on most days of the week. · Do not smoke. Smoking can increase your risk for health problems. If you need help quitting, talk to your doctor about stop-smoking programs and medicines. These can increase your chances of quitting for good. · Limit alcohol to 2 drinks a day for men and 1 drink a day for women.  Too much alcohol can cause health problems. If you have a BMI higher than 25  · Your doctor may do other tests to check your risk for weight-related health problems. This may include measuring the distance around your waist. A waist measurement of more than 40 inches in men or 35 inches in women can increase the risk of weight-related health problems. · Talk with your doctor about steps you can take to stay healthy or improve your health. You may need to make lifestyle changes to lose weight and stay healthy, such as changing your diet and getting regular exercise. If you have a BMI lower than 18.5  · Your doctor may do other tests to check your risk for health problems. · Talk with your doctor about steps you can take to stay healthy or improve your health. You may need to make lifestyle changes to gain or maintain weight and stay healthy, such as getting more healthy foods in your diet and doing exercises to build muscle. Where can you learn more? Go to http://abbe-katrina.info/. Enter S176 in the search box to learn more about \"Body Mass Index: Care Instructions. \"  Current as of: January 23, 2017  Content Version: 11.3  © 9482-1889 [employer]. Care instructions adapted under license by [employer] (which disclaims liability or warranty for this information). If you have questions about a medical condition or this instruction, always ask your healthcare professional. [address] any warranty or liability for your use of this information.

## 2017-09-19 NOTE — PROGRESS NOTES
Janet Garcia is a 47 y.o. female  Chief Complaint   Patient presents with    Flank Pain     right flank pain  x 2 months    Urgency     with increased frequency     1. Have you been to the ER, urgent care clinic since your last visit? Hospitalized since your last visit? No    2. Have you seen or consulted any other health care providers outside of the [address] [place] [address] since your last visit? Include any pap smears or colon screening.  No

## 2017-09-19 NOTE — MR AVS SNAPSHOT
Visit Information Date & Time Provider Department Dept. Phone Encounter #  
 9/19/2017 10:20 AM Vinny Rangel NP [facility] [phone] [id number] Follow-up Instructions Return if symptoms worsen or fail to improve. Your Appointments 9/27/2017  3:10 PM  
DIAG TEST F/U with Bill Leo MD  
[facility] (Mala Nogueira) Appt Note: MRI FU / $70 COPAY PT AWARE; MRI FU / $70 COPAY PT AWARE/ MRI done 09/09/17 pt to bring CD  
 [address] [facility] [id number] [phone]  
  
   
 [address] [facility] [address] Upcoming Health Maintenance Date Due INFLUENZA AGE 9 TO ADULT 8/1/2017 PAP AKA CERVICAL CYTOLOGY 7/1/2018 BREAST CANCER SCRN MAMMOGRAM 6/23/2019 COLONOSCOPY 6/21/2021 DTaP/Tdap/Td series (2 - Td) 4/25/2027 Allergies as of 9/19/2017  Review Complete On: 9/19/2017 By: Neeru Gil LPN Severity Noted Reaction Type Reactions Levaquin [Levofloxacin]  03/01/2010   Topical Rash, Itching Current Immunizations  Reviewed on 12/16/2016 Name Date Influenza Vaccine 11/16/2016, 11/15/2013 Influenza Vaccine (Quad) PF 8/25/2015, 9/29/2014 Influenza Vaccine Split 10/17/2012 PPD 7/26/2011 TB Skin Test (PPD) Intradermal 12/16/2016, 3/16/2015  2:25 PM  
 Tdap 4/25/2017 Not reviewed this visit You Were Diagnosed With   
  
 Codes Comments Acute right-sided thoracic back pain    -  Primary ICD-10-CM: M54.6 ICD-9-CM: 724.1 Facet arthropathy, lumbar     ICD-10-CM: M12.88 ICD-9-CM: 721.3 EKG abnormalities     ICD-10-CM: R94.31 
ICD-9-CM: 794.31 Chronic bilateral low back pain with bilateral sciatica     ICD-10-CM: M54.42, M54.41, G89.29 ICD-9-CM: 724.2, 724.3, 338.29 Vitals BP Pulse Temp Resp Height(growth percentile) Weight(growth percentile) 154/78 (BP 1 Location: Left arm, BP Patient Position: Sitting) 87 98.2 °F (36.8 °C) (Oral) 18 5' 5\" (1.651 m) 201 lb 6.4 oz (91.4 kg) LMP SpO2 BMI OB Status Smoking Status 01/30/2016 98% 33.51 kg/m2 Hysterectomy Never Smoker BMI and BSA Data Body Mass Index Body Surface Area  
 33.51 kg/m 2 2.05 m 2 Preferred Pharmacy Pharmacy Name Phone [facility]. Antonio Mendez [address] [facility] [phone] Your Updated Medication List  
  
   
This list is accurate as of: 9/19/17 12:36 PM.  Always use your most recent med list.  
  
  
  
  
 amphetamine-dextroamphetamine XR 20 mg XR capsule Commonly known as:  ADDERALL XR Take 40 mg by mouth daily. cetirizine 10 mg tablet Commonly known as:  ZYRTEC Take 1 Tab by mouth nightly as needed for Allergies. citalopram 10 mg tablet Commonly known as:  Isabella Hurt Take  by mouth daily. gabapentin 300 mg capsule Commonly known as:  NEURONTIN Take two pill by mouth nightly  
  
 methylPREDNISolone 4 mg tablet Commonly known as:  Mayra De Jesus Per dose pack instructions  
  
 traMADol 50 mg tablet Commonly known as:  ULTRAM  
Take 1 Tab by mouth every six (6) hours as needed for Pain. Max Daily Amount: 200 mg. Indications: Pain Prescriptions Sent to Pharmacy Refills  
 gabapentin (NEURONTIN) 300 mg capsule 1 Sig: Take two pill by mouth nightly Class: Normal  
 Pharmacy: [facility] Antonio Mendez [address] [place] [address] Ph #: [phone] We Performed the Following AMB POC EKG ROUTINE W/ 12 LEADS, INTER & REP [50664 CPT(R)] AMB POC URINALYSIS DIP STICK AUTO W/O MICRO [39249 CPT(R)] Follow-up Instructions Return if symptoms worsen or fail to improve. Patient Instructions Back Pain: Care Instructions Your Care Instructions Back pain has many possible causes.  It is often related to problems with muscles and ligaments of the back. It may also be related to problems with the nerves, discs, or bones of the back. Moving, lifting, standing, sitting, or sleeping in an awkward way can strain the back. Sometimes you don't notice the injury until later. Arthritis is another common cause of back pain. Although it may hurt a lot, back pain usually improves on its own within several weeks. Most people recover in 12 weeks or less. Using good home treatment and being careful not to stress your back can help you feel better sooner. Follow-up care is a key part of your treatment and safety. Be sure to make and go to all appointments, and call your doctor if you are having problems. Its also a good idea to know your test results and keep a list of the medicines you take. How can you care for yourself at home? · Sit or lie in positions that are most comfortable and reduce your pain. Try one of these positions when you lie down: ¨ Lie on your back with your knees bent and supported by large pillows. ¨ Lie on the floor with your legs on the seat of a sofa or chair. Celena Cao on your side with your knees and hips bent and a pillow between your legs. ¨ Lie on your stomach if it does not make pain worse. · Do not sit up in bed, and avoid soft couches and twisted positions. Bed rest can help relieve pain at first, but it delays healing. Avoid bed rest after the first day of back pain. · Change positions every 30 minutes. If you must sit for long periods of time, take breaks from sitting. Get up and walk around, or lie in a comfortable position. · Try using a heating pad on a low or medium setting for 15 to 20 minutes every 2 or 3 hours. Try a warm shower in place of one session with the heating pad. · You can also try an ice pack for 10 to 15 minutes every 2 to 3 hours. Put a thin cloth between the ice pack and your skin. · Take pain medicines exactly as directed. ¨ If the doctor gave you a prescription medicine for pain, take it as prescribed. ¨ If you are not taking a prescription pain medicine, ask your doctor if you can take an over-the-counter medicine. · Take short walks several times a day. You can start with 5 to 10 minutes, 3 or 4 times a day, and work up to longer walks. Walk on level surfaces and avoid hills and stairs until your back is better. · Return to work and other activities as soon as you can. Continued rest without activity is usually not good for your back. · To prevent future back pain, do exercises to stretch and strengthen your back and stomach. Learn how to use good posture, safe lifting techniques, and proper body mechanics. When should you call for help? Call your doctor now or seek immediate medical care if: 
· You have new or worsening numbness in your legs. · You have new or worsening weakness in your legs. (This could make it hard to stand up.) · You lose control of your bladder or bowels. Watch closely for changes in your health, and be sure to contact your doctor if: 
· Your pain gets worse. · You are not getting better after 2 weeks. Where can you learn more? Go to http://abbe-katrina.info/. Enter F897 in the search box to learn more about \"Back Pain: Care Instructions. \" Current as of: March 21, 2017 Content Version: 11.3 © 4046-8717 HashParade. Care instructions adapted under license by Popps Apps (which disclaims liability or warranty for this information). If you have questions about a medical condition or this instruction, always ask your healthcare professional. [place] [address] any warranty or liability for your use of this information. Body Mass Index: Care Instructions Your Care Instructions Body mass index (BMI) can help you see if your weight is raising your risk for health problems.  It uses a formula to compare how much you weigh with how tall you are. · A BMI lower than 18.5 is considered underweight. · A BMI between 18.5 and 24.9 is considered healthy. · A BMI between 25 and 29.9 is considered overweight. A BMI of 30 or higher is considered obese. If your BMI is in the normal range, it means that you have a lower risk for weight-related health problems. If your BMI is in the overweight or obese range, you may be at increased risk for weight-related health problems, such as high blood pressure, heart disease, stroke, arthritis or joint pain, and diabetes. If your BMI is in the underweight range, you may be at increased risk for health problems such as fatigue, lower protection (immunity) against illness, muscle loss, bone loss, hair loss, and hormone problems. BMI is just one measure of your risk for weight-related health problems. You may be at higher risk for health problems if you are not active, you eat an unhealthy diet, or you drink too much alcohol or use tobacco products. Follow-up care is a key part of your treatment and safety. Be sure to make and go to all appointments, and call your doctor if you are having problems. It's also a good idea to know your test results and keep a list of the medicines you take. How can you care for yourself at home? · Practice healthy eating habits. This includes eating plenty of fruits, vegetables, whole grains, lean protein, and low-fat dairy. · If your doctor recommends it, get more exercise. Walking is a good choice. Bit by bit, increase the amount you walk every day. Try for at least 30 minutes on most days of the week. · Do not smoke. Smoking can increase your risk for health problems. If you need help quitting, talk to your doctor about stop-smoking programs and medicines. These can increase your chances of quitting for good. · Limit alcohol to 2 drinks a day for men and 1 drink a day for women. Too much alcohol can cause health problems. If you have a BMI higher than 25 · Your doctor may do other tests to check your risk for weight-related health problems. This may include measuring the distance around your waist. A waist measurement of more than 40 inches in men or 35 inches in women can increase the risk of weight-related health problems. · Talk with your doctor about steps you can take to stay healthy or improve your health. You may need to make lifestyle changes to lose weight and stay healthy, such as changing your diet and getting regular exercise. If you have a BMI lower than 18.5 · Your doctor may do other tests to check your risk for health problems. · Talk with your doctor about steps you can take to stay healthy or improve your health. You may need to make lifestyle changes to gain or maintain weight and stay healthy, such as getting more healthy foods in your diet and doing exercises to build muscle. Where can you learn more? Go to http://abbe-katrina.info/. Enter S176 in the search box to learn more about \"Body Mass Index: Care Instructions. \" Current as of: January 23, 2017 Content Version: 11.3 © 2587-6684 Money On Mobile. Care instructions adapted under license by [employer] (which disclaims liability or warranty for this information). If you have questions about a medical condition or this instruction, always ask your healthcare professional. [address] any warranty or liability for your use of this information. Introducing [facility]! Dear Rico Gutiérrez: Thank you for requesting a Lime&Tonic account. Our records indicate that you already have an active Lime&Tonic account. You can access your account anytime at [email or web address] Did you know that you can access your hospital and ER discharge instructions at any time in Lime&Tonic? You can also review all of your test results from your hospital stay or ER visit. Additional Information If you have questions, please visit the Frequently Asked Questions section of the Invoy Technologieshart website at [email or web address] Remember, Geostellar is NOT to be used for urgent needs. For medical emergencies, dial 911. Now available from your iPhone and Android! Please provide this summary of care documentation to your next provider. Your primary care clinician is listed as Angella Cervantes. If you have any questions after today's visit, please call [phone].

## 2017-09-19 NOTE — PROGRESS NOTES
1. Have you been to the ER, urgent care clinic since your last visit? Hospitalized since your last visit? No    2. Have you seen or consulted any other health care providers outside of the [address] [place] [address] since your last visit? Include any pap smears or colon screening. Yes at the [facility] one month ago routine follow up with PCP there     [facility]    Taco Toth is a 47y.o. year old female here today for:  Right sided thoracic pain for over one week    Onset over one month ago  Context was seen by spine center facet arthopathy lumbar 4, 5 with radiculopathy and advised to restart gabapentin  Site right thoracic mid to lower  Duration  Over one month   Type of pain or sensation deep aching intermittent and will radiate laterally toward abdomen  Associated symptoms urgency of urination  Severity moderate  Exacerbating factors none  Relieving factors none  ROS:  Denies fever,chills, denies dysuria, denies sciatic pain denies pain with twisting or movement of torso, denies fever, chills. New concern: went to a weight loss center and EKG done and patient reports that she was told to follow up because of abnormalities. EKG was brought to visit. Poor quality NSR see copy will repeat today. ROS: Denies chest pain or dyspnea. Current Outpatient Prescriptions   Medication Sig Dispense Refill    methylPREDNISolone (MEDROL DOSEPACK) 4 mg tablet Per dose pack instructions 21 Tab 0    gabapentin (NEURONTIN) 300 mg capsule Take one pill nightly for three nights by mouth and if tolerated increase to twice daily by mouth 60 Cap 1    traMADol (ULTRAM) 50 mg tablet Take 1 Tab by mouth every six (6) hours as needed for Pain. Max Daily Amount: 200 mg. Indications: Pain 40 Tab 0    cetirizine (ZYRTEC) 10 mg tablet Take 1 Tab by mouth nightly as needed for Allergies. 30 Tab 3    amphetamine-dextroamphetamine XR (ADDERALL XR) 20 mg XR capsule Take 40 mg by mouth daily.   0    citalopram (CELEXA) 10 mg tablet Take  by mouth daily. Patient Active Problem List   Diagnosis Code    DDD (degenerative disc disease) [id number]    DJD (degenerative joint disease) of cervical spine M47.812    HLD (hyperlipidemia) E78.5    ADD (attention deficit disorder) F98.8    PTSD (post-traumatic stress disorder) F43.10    Depression F32.9    Chronic bilateral low back pain with bilateral sciatica M54.42, M54.41, G89.29    Seasonal allergic rhinitis due to pollen J30.1    BMI 32.0-32.9,adult Z68.32     Reviewed past medical, family and social history      OBJECTIVE:  Awake and alert in no acute distress  Lungs clear throughout  S1 S2 RRR without ectopy or murmur auscultated. Abdomen: normoactive bowel sounds all quadrants, no tenderness to abdomen upper and lower quadrants. No hepatosplenomegaly  Negative CVAT bilaterally  Visit Vitals    /78 (BP 1 Location: Left arm, BP Patient Position: Sitting)    Pulse 87    Temp 98.2 °F (36.8 °C) (Oral)    Resp 18    Ht 5' 5\" (1.651 m)    Wt 201 lb 6.4 oz (91.4 kg)    SpO2 98%    BMI 33.51 kg/m2   POC: EKG NSR no acute STT changes rate 71 no ectopy  Results for orders placed or performed in visit on 09/19/17   AMB POC URINALYSIS DIP STICK AUTO W/O MICRO   Result Value Ref Range    Color (UA POC) Yellow     Clarity (UA POC) Clear     Glucose (UA POC) Negative Negative    Bilirubin (UA POC) Negative Negative    Ketones (UA POC) Negative Negative    Specific gravity (UA POC) 1.010 1.001 - 1.035    Blood (UA POC) Negative Negative    pH (UA POC) 7.5 4.6 - 8.0    Protein (UA POC) Negative Negative mg/dL    Urobilinogen (UA POC) normal 0.2 - 1    Nitrites (UA POC) Negative Negative    Leukocyte esterase (UA POC) Negative Negative     Diagnoses and all orders for this visit:    1. Acute right-sided thoracic back pain  -     AMB POC URINALYSIS DIP STICK AUTO W/O MICRO  -     gabapentin (NEURONTIN) 300 mg capsule; Take two pill by mouth nightly    2. Facet arthropathy, lumbar  -     gabapentin (NEURONTIN) 300 mg capsule; Take two pill by mouth nightly    3. EKG abnormalities  -     AMB POC EKG ROUTINE W/ 12 LEADS, INTER & REP    4. Chronic bilateral low back pain with bilateral sciatica  -     gabapentin (NEURONTIN) 300 mg capsule; Take two pill by mouth nightly      General comfort measures  Advised could send to physical therapy patient reports that the [facility] already   Anticipatory guidance regarding emergency care if symptoms worsen and patient verbalizes understanding. I have discussed the diagnosis with the patient and the intended plan as seen in the above orders. The patient has received an after-visit summary and questions were answered concerning future plans. I have discussed medication side effects and warnings with the patient as well. Follow-up Disposition:  Return if symptoms worsen or fail to improve.

## 2017-09-21 VITALS
BODY MASS INDEX: 33.55 KG/M2 | HEIGHT: 65 IN | DIASTOLIC BLOOD PRESSURE: 78 MMHG | RESPIRATION RATE: 18 BRPM | HEART RATE: 87 BPM | TEMPERATURE: 98.2 F | WEIGHT: 201.4 LBS | OXYGEN SATURATION: 98 % | SYSTOLIC BLOOD PRESSURE: 154 MMHG

## 2017-09-27 ENCOUNTER — OFFICE VISIT (OUTPATIENT)
Dept: ORTHOPEDIC SURGERY | Age: 52
End: 2017-09-27

## 2017-09-27 VITALS
TEMPERATURE: 98.4 F | RESPIRATION RATE: 18 BRPM | HEIGHT: 65 IN | HEART RATE: 106 BPM | SYSTOLIC BLOOD PRESSURE: 148 MMHG | OXYGEN SATURATION: 100 % | DIASTOLIC BLOOD PRESSURE: 92 MMHG | WEIGHT: 202.8 LBS | BODY MASS INDEX: 33.79 KG/M2

## 2017-09-27 DIAGNOSIS — M70.61 TROCHANTERIC BURSITIS OF BOTH HIPS: ICD-10-CM

## 2017-09-27 DIAGNOSIS — M53.3 SACROILIAC JOINT DYSFUNCTION OF BOTH SIDES: Primary | ICD-10-CM

## 2017-09-27 DIAGNOSIS — M70.62 TROCHANTERIC BURSITIS OF BOTH HIPS: ICD-10-CM

## 2017-09-27 DIAGNOSIS — D70.8 AUTOIMMUNE NEUTROPENIA (HCC): ICD-10-CM

## 2017-09-27 DIAGNOSIS — M47.816 SPONDYLOSIS OF LUMBAR REGION WITHOUT MYELOPATHY OR RADICULOPATHY: Chronic | ICD-10-CM

## 2017-09-27 RX ORDER — NAPROXEN 500 MG/1
TABLET ORAL
Qty: 60 TAB | Refills: 1 | Status: SHIPPED | OUTPATIENT
Start: 2017-09-27 | End: 2018-08-21

## 2017-09-27 NOTE — MR AVS SNAPSHOT
Visit Information Date & Time Provider Department Dept. Phone Encounter #  
 9/27/2017  3:10 PM Gerardo Florence MD [facility] [facility] [phone] [id number] Upcoming Health Maintenance Date Due INFLUENZA AGE 9 TO ADULT 8/1/2017 PAP AKA CERVICAL CYTOLOGY 7/1/2018 BREAST CANCER SCRN MAMMOGRAM 6/23/2019 COLONOSCOPY 6/21/2021 DTaP/Tdap/Td series (2 - Td) 4/25/2027 Allergies as of 9/27/2017  Review Complete On: 9/27/2017 By: Gerardo Florence MD  
  
 Severity Noted Reaction Type Reactions Levaquin [Levofloxacin]  03/01/2010   Topical Rash, Itching Current Immunizations  Reviewed on 12/16/2016 Name Date Influenza Vaccine 11/16/2016, 11/15/2013 Influenza Vaccine (Quad) PF 8/25/2015, 9/29/2014 Influenza Vaccine Split 10/17/2012 PPD 7/26/2011 TB Skin Test (PPD) Intradermal 12/16/2016, 3/16/2015  2:25 PM  
 Tdap 4/25/2017 Not reviewed this visit You Were Diagnosed With   
  
 Codes Comments Sacroiliac joint dysfunction of both sides    -  Primary ICD-10-CM: M53.3 ICD-9-CM: 724.6 Trochanteric bursitis of both hips     ICD-10-CM: M70.61, M70.62 ICD-9-CM: 726.5 Autoimmune neutropenia (HCC)     ICD-10-CM: D70.8 ICD-9-CM: 288.09 Spondylosis of lumbar region without myelopathy or radiculopathy     ICD-10-CM: M47.816 ICD-9-CM: 721.3 Vitals BP Pulse Temp Resp Height(growth percentile) Weight(growth percentile) (!) 148/92 (!) 106 98.4 °F (36.9 °C) (Oral) 18 5' 5\" (1.651 m) 202 lb 12.8 oz (92 kg) LMP SpO2 BMI OB Status Smoking Status 01/30/2016 100% 33.75 kg/m2 Hysterectomy Never Smoker BMI and BSA Data Body Mass Index Body Surface Area 33.75 kg/m 2 2.05 m 2 Preferred Pharmacy Pharmacy Name Phone [facility]. [facility], [address] [facility]. [phone] Your Updated Medication List  
  
   
 This list is accurate as of: 17  4:04 PM.  Always use your most recent med list.  
  
  
  
  
 amphetamine-dextroamphetamine XR 20 mg XR capsule Commonly known as:  ADDERALL XR Take 40 mg by mouth daily. cetirizine 10 mg tablet Commonly known as:  ZYRTEC Take 1 Tab by mouth nightly as needed for Allergies. citalopram 10 mg tablet Commonly known as:  Drena Scott Take  by mouth daily. gabapentin 300 mg capsule Commonly known as:  NEURONTIN Take two pill by mouth nightly  
  
 naproxen 500 mg tablet Commonly known as:  NAPROSYN  
1 tab PO every day to BID with food PRN pain  
  
 traMADol 50 mg tablet Commonly known as:  ULTRAM  
Take 1 Tab by mouth every six (6) hours as needed for Pain. Max Daily Amount: 200 mg. Indications: Pain Prescriptions Sent to Pharmacy Refills  
 naproxen (NAPROSYN) 500 mg tablet 1 Si tab PO every day to BID with food PRN pain  
 Class: Normal  
 Pharmacy: [facility] Altamirano . Stefany Laboy, [address] [facility] #: [phone] We Performed the Following REFERRAL TO RHEUMATOLOGY [RXU92 Custom] Comments:  
 SI joint pain, hx of auto immune, neutropenia Referral Information Referral ID Referred By Referred To  
  
 [id number] Marybeth Rossi Not Available Visits Status Start Date End Date 1 New Request 17 If your referral has a status of pending review or denied, additional information will be sent to support the outcome of this decision. Introducing [facility]! Dear Sharan Antony: Thank you for requesting a TeamLease Services account. Our records indicate that you already have an active TeamLease Services account. You can access your account anytime at [email or web address] Did you know that you can access your hospital and ER discharge instructions at any time in TeamLease Services? You can also review all of your test results from your hospital stay or ER visit. Additional Information If you have questions, please visit the Frequently Asked Questions section of the Cat Amaniat website at [email or web address] Remember, v2 Ratings is NOT to be used for urgent needs. For medical emergencies, dial 911. Now available from your iPhone and Android! Please provide this summary of care documentation to your next provider. Your primary care clinician is listed as Donita Cervantes. If you have any questions after today's visit, please call [phone].

## 2017-09-27 NOTE — PROGRESS NOTES
[facility] [address].  [address], [address]  [place] [address]  Phone: [phone]  Fax: [phone]        Vinny Valerio  : 1965  PCP: Mariana Spann NP    PROGRESS NOTE      ASSESSMENT AND PLAN    Diagnoses and all orders for this visit:    1. Sacroiliac joint dysfunction of both sides  -     REFERRAL TO RHEUMATOLOGY    2. Trochanteric bursitis of both hips    3. history of Autoimmune neutropenia (HCC)  -     REFERRAL TO RHEUMATOLOGY    4. Spondylosis of lumbar region without myelopathy or radiculopathy, relative stenosis L4/5    Other orders  -     naproxen (NAPROSYN) 500 mg tablet; 1 tab PO every day to BID with food PRN pain    1. Advised to continue HEP. 2. Continue Gabapentin. 3. Rx for PRN Naprosyn. 4. No indications for surgery at this time. 5. Discussed SI joint injections as possible treatment option. 6. Referral to rheumatology. Follow-up Disposition: Not on File      HISTORY OF PRESENT ILLNESS  Solo Diaz is a 47 y.o. female. Pt presents to the office for a f/u visit for back pain. Last visit pt was sent to have a lumbar spine MRI. Images reviewed with the pt. She was given MDP and restarted Gabapentin last visit. She reports benefit from restarting Gabapentin and MDP. Pt continues to have back pain. Pt reports pain does radiate into RLE with paresthesias. She has increased pain with standing and walking but laying down will reproduce her most severe pain. She has difficulty rising from bed in the morning. Her pain will bother her with prolonged sitting as well. She denies any groin pain. She notes that she had a recent pain in her flank and went to her PCP as she thought she had a kidney infection. Pt does not have weakness in BLE. Pt denies saddle paresthesias. Pt states she has been using Gabapentin QHS with relief. She is no longer taking Tramadol or OTC antiinflammatories. She is on Celexa.  Denies persistent fevers, chills, weight changes, neurogenic bowel or bladder symptoms. Pt denies recent ED visits or hospitalizations. Hx of autoimmune neutropenia. Pt has seen rheumatology 20 years ago, Dx with fibromyalgia. +am stiffness    Pain Assessment  9/27/2017   Location of Pain Back   Location Modifiers -   Severity of Pain 4   Quality of Pain -   Duration of Pain Persistent   Frequency of Pain Constant   Aggravating Factors Stairs; Walking;Standing;Squatting;Kneeling;Exercise;Straightening;Stretching;Bending   Aggravating Factors Comment -   Relieving Factors Other (Comment)   Relieving Factors Comment meds   Result of Injury No          Lumbar spine MRI from 8/2017 reviewed:  IMPRESSION  Multilevel Degenerative findings causing various degrees of spinal canal and neural foraminal narrowing. Spinal canal narrowing is most prominent at L4-5. PAST MEDICAL HISTORY   Past Medical History:   Diagnosis Date    Anemia 3/1/2010    Ankle fracture 5/09    right ankle    Cervical spine pain     Degenerative cervical disc     Seasonal allergic rhinitis     Thyroid disease        Past Surgical History:   Procedure Laterality Date    HX GI  1990\", 2016    bowl obstruction repair x2    HX GYN  1990's    etopic pregnancy    HX SUSANNA AND BSO  12/2015    HX TUBAL LIGATION     . MEDICATIONS    Current Outpatient Prescriptions   Medication Sig Dispense Refill    gabapentin (NEURONTIN) 300 mg capsule Take two pill by mouth nightly 180 Cap 1    traMADol (ULTRAM) 50 mg tablet Take 1 Tab by mouth every six (6) hours as needed for Pain. Max Daily Amount: 200 mg. Indications: Pain 40 Tab 0    cetirizine (ZYRTEC) 10 mg tablet Take 1 Tab by mouth nightly as needed for Allergies. 30 Tab 3    amphetamine-dextroamphetamine XR (ADDERALL XR) 20 mg XR capsule Take 40 mg by mouth daily. 0    citalopram (CELEXA) 10 mg tablet Take  by mouth daily.           ALLERGIES  Allergies   Allergen Reactions    Levaquin [Levofloxacin] Rash and Itching          SOCIAL HISTORY    Social History     Social History    Marital status:      Spouse name: N/A    Number of children: N/A    Years of education: N/A     Occupational History    Not on file. Social History Main Topics    Smoking status: Never Smoker    Smokeless tobacco: Never Used    Alcohol use Yes    Drug use: No    Sexual activity: No     Other Topics Concern    Not on file     Social History Narrative       FAMILY HISTORY  Family History   Problem Relation Age of Onset    Cancer Father      lung cancer    Diabetes Mother     Hypertension Mother     Diabetes Maternal Aunt     Diabetes Maternal Grandmother        REVIEW OF SYSTEMS  Review of Systems   Constitutional: Negative for chills, fever and weight loss. Respiratory: Negative for shortness of breath. Cardiovascular: Negative for chest pain. Gastrointestinal: Negative for constipation. Negative for fecal incontinence   Genitourinary: Negative for dysuria. Negative for urinary incontinence   Musculoskeletal:        Per HPI   Skin: Negative for rash. Neurological: Negative for dizziness, tingling, tremors, focal weakness and headaches. Endo/Heme/Allergies: Does not bruise/bleed easily. Psychiatric/Behavioral: The patient does not have insomnia. PHYSICAL EXAMINATION  Visit Vitals    BP (!) 148/92    Pulse (!) 106    Temp 98.4 °F (36.9 °C) (Oral)    Resp 18    Ht 5' 5\" (1.651 m)    Wt 202 lb 12.8 oz (92 kg)    LMP 01/30/2016    SpO2 100%    BMI 33.75 kg/m2         Accompanied by self. Constitutional:  Well developed, well nourished, in no acute distress. Psychiatric: Affect and mood are appropriate. Integumentary: No rashes or abrasions noted on exposed areas. Cardiovascular/Peripheral Vascular: Intact l pulses. No peripheral edema is noted. Lymphatic:  No evidence of lymphedema. No cervical lymphadenopathy.      SPINE/MUSCULOSKELETAL EXAM    Lumbar spine:  No rash, ecchymosis, or gross obliquity. No fasciculations. No focal atrophy is noted. Tenderness to palpation bilateral trochanteric bursa. Tenderness to palpation of bilateral SI joints. No tenderness to palpation at the sciatic notch. Sensation grossly intact to light touch. MOTOR:       Hip Flex  Quads Hamstrings Ankle DF EHL Ankle PF   Right +4/5 +4/5 +4/5 +4/5 +4/5 +4/5   Left +4/5 +4/5 +4/5 +4/5 +4/5 +4/5     Negative JUWAN    Straight Leg raise negative. Ambulation without assistive device. FWB. Written by Adelina Valenzuela, as dictated by Corie Lester MD.    I, Dr. Corie Lester MD, confirm that all documentation is accurate. Ms. Dinora Mullins may have a reminder for a \"due or due soon\" health maintenance. I have asked that she contact her primary care provider for follow-up on this health maintenance.

## 2017-09-27 NOTE — PROGRESS NOTES
Verbal order entered per Dr. Radha Arias as documented on blue sheet:   -naprosyn 500 mg, 1 tab PO every day to BID with food PRN pain, disp 60, 1 RF  -referralto rheumotology eval, SI joint pain, hx of auto immune neutropenia

## 2017-10-02 ENCOUNTER — TELEPHONE (OUTPATIENT)
Dept: ORTHOPEDIC SURGERY | Age: 52
End: 2017-10-02

## 2017-10-02 NOTE — TELEPHONE ENCOUNTER
Left message for patient that her Rheumatology appt is scheduled for 12-01-17 to arrive @ 8:45am with Dr. Dario Alves

## 2017-10-09 ENCOUNTER — HOSPITAL ENCOUNTER (OUTPATIENT)
Dept: PHYSICAL THERAPY | Age: 52
Discharge: HOME | End: 2017-10-09
Payer: COMMERCIAL

## 2017-10-09 PROCEDURE — 97162 PT EVAL MOD COMPLEX 30 MIN: CPT

## 2017-10-09 PROCEDURE — 97530 THERAPEUTIC ACTIVITIES: CPT

## 2017-10-09 PROCEDURE — 97110 THERAPEUTIC EXERCISES: CPT

## 2017-10-09 NOTE — PROGRESS NOTES
[facility]  [address] [facility]  [phone] fax    Plan of Care/ Statement of Necessity for Physical Therapy Services    Patient name: Magno Costa Start of Care: 10/9/2017   Referral source: Tricia Robles NP : [id number]    Medical Diagnosis: Back spasm [M62.830]   Onset Date:chronic    Treatment Diagnosis: LBP   Prior Hospitalization: see medical history Provider#: [id number]   Medications: Verified on Patient summary List    Comorbidities: fibromyalgia, depression, chronic back pain   Prior Level of Function: less pain with ADLs/mobility, living in 3 story house     The [address] [place] [address] and following information is based on the information from the initial evaluation. Assessment/ key information: Ms. Denisse Stark is a 46 y/o F pt with CC of chronic LBP with numbness/tingling in R LE and R LE bucking (partially due to R knee problem). Imaging done including MRI in  showing disc protrusion L5/S1, central canal stenosis L4/5, disc bulging L3/4, facet DJD L3/4 and L4/5; planned to have a new one soon. Pt presented with poor posture with min R lat trunk lean, decreased B LEs strength (worst with hip flex, due to pain?), poor core strength, poor tolerance to sitting, mod tolerance with prone/supine but required knees flexed/propped. Pt also demonstrated poor gait pattern (mod-max antalgic with decreased weight shifting towards R, limping constantly), decreased sensation with light touch on L LE (along L2-4 distribution) but (-) with quadrant and SLR in both sitting/supine positions; pelvic alignment was WNL. Pt would benefit from skilled PT to address these deficits above.     Evaluation Complexity History HIGH Complexity :3+ comorbidities / personal factors will impact the outcome/ POC ; Examination MEDIUM Complexity : 3 Standardized tests and measures addressing body structure, function, activity limitation and / or participation in recreation ;Presentation MEDIUM Complexity : Evolving with changing characteristics  ; Clinical Decision Making MEDIUM Complexity : FOTO score of 26-74  Overall Complexity Rating: MEDIUM  Problem List: pain affecting function, decrease ROM, decrease strength, edema affecting function, impaired gait/ balance, decrease ADL/ functional abilitiies, decrease activity tolerance, decrease flexibility/ joint mobility and decrease transfer abilities   Treatment Plan may include any combination of the following: Therapeutic exercise, Therapeutic activities, Neuromuscular re-education, Physical agent/modality, Gait/balance training, Manual therapy, Patient education, Self Care training, Functional mobility training, Home safety training and Stair training  Patient / Family readiness to learn indicated by: asking questions, trying to perform skills and interest  Persons(s) to be included in education: patient (P)  Barriers to Learning/Limitations: yes;  physical and other chronicity of the problem  Patient Goal (s): less pain  Patient Self Reported Health Status: fair  Rehabilitation Potential: fair    Short term goals: To be accomplished within 1 week   1. Pt will be independent with HEP to maintain progression. Long term goals: To be accomplished within 4 weeks   1. Pt will improve FOTO score by 7 points to show improvement with functional mobility performance. 2. Pt will report no more than 6/10 pain to improve her QOL. 3. Pt will have B hips strength at least 4-/5 to improve amb tolerance. 4. Pt will demonstrate good core contraction/maintain TA draw for amb/ADLs performance to improve her posture for physical activities performance. Frequency / Duration: Patient to be seen 2 times per week for 4 weeks.     Patient/ CarPatient/ Caregiver education and instruction: Diagnosis, prognosis, self care, activity modification, brace/ splint application and exercises   [x]  Plan of care has been reviewed with ABDULLAHI Manning, PT 10/9/2017 8:50 AM    ________________________________________________________________________    I certify that the above Therapy Services are being furnished while the patient is under my care. I agree with the treatment plan and certify that this therapy is necessary.     Physician's Signature:____________________  Date:____________Time: _________    Please sign and return to [facility]  [address] [facility]  [phone] fax

## 2017-10-09 NOTE — PROGRESS NOTES
PT DAILY TREATMENT NOTE/LUMBAR EVAL 3-16    Patient Name: Ezra Buck  Date:10/9/2017  : 1965  [x]  Patient  Verified  Payor: [employer] / Plan: [employer] / Product Type: HMO /    In time:8:07  Out time: 8:38  Total Treatment Time (min): 31  Total Timed Codes (min): 16  1:1 Treatment Time ( only): 31   Visit #: 1 of 8    Treatment Area: Back spasm [M62.830]  SUBJECTIVE  Pain Level (0-10 scale): 7  []constant []intermittent []improving []worsening []no change since onset    Any medication changes, allergies to medications, adverse drug reactions, diagnosis change, or new procedure performed?: [x] No    [] Yes (see summary sheet for update)  Subjective functional status/changes:     PLOF: less pain with ADLs/mobility  Limitations to PLOF: chronic back pain  Mechanism of Injury: boot camp  Current symptoms/Complaints: pain with prolonged sitting, mod relief with standing, difficulty with sleeping due to pain  Previous Treatment/Compliance: pain med, PT   PMHx/Surgical Hx: none for back  Work Hx: walking  Living Situation: living in a 3 story house, living with family  Pt Goals: \"less pain\"  Barriers: []pain []financial []time []transportation []other  Motivation: yes  Substance use: []Alcohol []Tobacco []other:   FABQ Score: []low []elevate  Cognition: A & O x 4    Other:    OBJECTIVE/EXAMINATION  Domestic Life:   Activity/Recreational Limitations: bike riding  Mobility:   Self Care:       15 min [x]Eval                  []Re-Eval       8 min Therapeutic Exercise:  [] See flow sheet :   Rationale: increase ROM and increase strength to improve the patients ability to tolerate amb/ADLs    8 min Therapeutic Activity:  []  See flow sheet : pt edu within scope of practice on prognosis, spine anatomy, POC, modalities   Rationale: increase ROM, increase strength, improve coordination, improve balance and increase proprioception  to improve the patients ability to perform ADLs/sleeping with ease With   [] TE   [] TA   [] neuro   [] other: Patient Education: [x] Review HEP    [] Progressed/Changed HEP based on:   [] positioning   [] body mechanics   [] transfers   [] heat/ice application    [] other:      Other Objective/Functional Measures:     Physical Therapy Evaluation - Lumbar Spine ([facility])    SUBJECTIVE  Symptoms:  Aggravated by:   [] Bending [x] Sitting [] Standing [] Walking   [] Moving [] Cough [] Sneeze [] Valsalva   [] AM  [] PM  Lying:  [] sup   [] pro   [] sidelying   [] Other:     Eased by:    [] Bending [] Sitting [x] Standing [] Walking   [] Moving [] AM  [] PM  Lying: [] sup  [] pro  [] sidelying   [] Other:     General Health:  Red Flags Indicated? [] Yes    [] No  [] Yes [] No Recent weight change (If yes, due to dieting? [] Yes  [] No)   [x] Yes [] No Weakness in legs during walking   [] Yes [] No Unremitting pain at night  [] Yes [] No Abdominal pain or problems  [] Yes [] No Rectal bleeding  [] Yes [] No Feet more cold or painful in cold weather  [] Yes [] No Menstrual irregularities  [] Yes [] No Blood or pain with urination  [] Yes [] No Dysfunction of bowel or bladder  [] Yes [] No Recent illness within past 3 weeks (i.e, cold, flu)  [x] Yes [] No Numbness/tingling in buttock/genitalia region R LE  Past History/Treatments:     Diagnostic Tests: [] Lab work [] X-rays    [] CT [x] MRI in 2013     [] Other:  Results: Multilevel relatively mild degenerative disc disease.     Potential sources of left-sided radiculopathy include:     -L5/S1 central disc protrusion with tiny extruded component may contact the  emerging left S1 nerve root in the lateral recess. -Mild central canal stenosis L4/L5 with encroachment on both L5 nerve roots,  slightly worse on the left than the right. A left far lateral disc bulge at  this level may contact the left L4 nerve root. -L3/L4 disc bulge may contact but does not appear to impinge the left L3 nerve  root.   -Please correlate with distribution of patient's radiculopathy.     Facet DJD L3/4 and L4/5.     Functional Status  Prior level of function:  Present functional limitations:  What position do you sleep in?: back, sides    OBJECTIVE  Posture:  Lateral Shift: min [x] R    [] L     [] +  [] -  Kyphosis: [] Increased [x] Decreased   []  WNL  Lordosis:  [x] Increased [] Decreased   [] WNL  Pelvic symmetry: [] WNL    [] Other:    Gait:  [] Normal     [x] Abnormal: antalgic, limping, decreased weight shifting B R>L; decreased R foot rockers    Active Movements: [] N/A   [] Too acute   [] Other:  ROM % AROM % PROM Comments:pain, area   Forward flexion 40-60 ~5  pain   Extension 20-30 ~10     SB right 20-30 ~5     SB left 20-30 ~5  More pain   Rotation right 5-10 WFL     Rotation left 5-10 WFL       Repeated Movements   Effects on present pain: produces (NH), abolishes (A), increases (incr), decreases (decr), centralizes (C), peripheral (PH), no effect (NE)   Pre-Test Sx Flexion Repeated Flexion Extension Repeated Extension Repeated SBL Repeated SBR   Sitting          Standing          Lying      N/A N/A   Comments:  Side Glide:  Sustained passive positioning test:    Neuro Screen [] WNL  Myotome/Dermatome/Reflexes:  Comments: decreased sensation with light touch on R LE, no specific pattern    Dural Mobility:  SLR Sitting: [x] R    [x] L    [] +    [x] -  @ (degrees):           Supine: [x] R    [] L    [x] +    [x] -  @ (degrees):   Slump Test: [] R    [] L    [] +    [] -  @ (degrees):   Prone Knee Bend: [x] R    [x] L    [x] + pain in back    [] -     Palpation  [] Min  [x] Mod  [x] Severe    Location: pain with light palpation along l/x paraspinal musculature  [] Min  [] Mod  [] Severe    Location:  [] Min  [] Mod  [] Severe    Location:    Strength   L(0-5) R (0-5) N/T   Hip Flexion (L1,2) 3- 3- []   Knee Extension (L3,4) 4- 4- []   Ankle Dorsiflexion (L4) 3+ 3+ []   Great Toe Extension (L5)   []   Ankle Plantarflexion (S1) 3 3 []   Knee Flexion (S1,2) 3+ 3+ []   Upper Abdominals   []   Lower Abdominals   []   Paraspinals   []   Back Rotators   []   Gluteus Kaden   []   Other   []     Special Tests  Lumbar:  Lumb. Compression: [] Pos  [] Neg               Lumbar Distraction:   [] Pos  [] Neg    Quadrant:  [x] Pos  [] Neg   [] Flex  [x] Ext in L LE when performing toward R; (-) with L side     Sacroilliac:  Gaenslen's: [] R    [] L    [] +    [] -     Compression: [] +    [] -     Gapping:  [] +    [] -     Thigh Thrust: [] R    [] L    [] +    [] -     Leg Length: [] +    [] -   Position:    Crests:    ASIS:    PSIS:    Sacral Sulcus:    Mobility: Standing flex:     Sitting flex:     Supine to sit:     Prone knee bend:         Hip: Alida Cooks:  [] R    [] L    [] +    [] -     Scour:  [] R    [] L    [] +    [] -     Piriformis: [] R    [] L    [] +    [] -          Deficits: Lavinia's: [] R    [] L    [] +    [] -     Jonatan: [] R    [] L    [] +    [] -     Hamstrings 90/90:    Gastrocsoleus (to neutral): Right: Left:       Global Muscular Weakness:  Abdominals:  Quadratus Lumborum:  Paraspinals: Other:    Other tests/comments:   Pelvic alignment: WNL       Pain Level (0-10 scale) post treatment: 7/10    ASSESSMENT/Changes in Function: see POC    Patient will continue to benefit from skilled PT services to modify and progress therapeutic interventions, address functional mobility deficits, address ROM deficits, address strength deficits, analyze and address soft tissue restrictions, analyze and cue movement patterns, analyze and modify body mechanics/ergonomics, assess and modify postural abnormalities, address imbalance/dizziness and instruct in home and community integration to attain remaining goals.      [x]  See Plan of Care  []  See progress note/recertification  []  See Discharge Summary         Progress towards goals / Updated goals:  See POC    PLAN  []  Upgrade activities as tolerated     [x]  Continue plan of care  []  Update interventions per flow sheet       [] Discharge due to:_  []  Other:_      Yu Hinkle, PT 10/9/2017  8:09 AM

## 2017-10-13 ENCOUNTER — HOSPITAL ENCOUNTER (OUTPATIENT)
Dept: PHYSICAL THERAPY | Age: 52
Discharge: HOME | End: 2017-10-13
Payer: COMMERCIAL

## 2017-10-13 PROCEDURE — 97110 THERAPEUTIC EXERCISES: CPT

## 2017-10-13 PROCEDURE — 97014 ELECTRIC STIMULATION THERAPY: CPT

## 2017-10-13 NOTE — PROGRESS NOTES
PT DAILY TREATMENT NOTE     Patient Name: Magno Costa  Date:10/13/2017  : 1965  [x]  Patient  Verified  Payor: [employer] / Plan: [address] / Product Type: Federal Funded Programs /    In [facility] time:815  Total Treatment Time (min): 40  Visit #: 2 of 8    Treatment Area: Back spasm [M62.830]    SUBJECTIVE  Pain Level (0-10 scale): 4/10  Any medication changes, allergies to medications, adverse drug reactions, diagnosis change, or new procedure performed?: [x] No    [] Yes (see summary sheet for update)  Subjective functional status/changes:   [] No changes reported  Pt stated that she is doing a little better, but cont with pain in her low back    OBJECTIVE    Modality rationale: decrease pain and increase tissue extensibility to improve the patients ability to increase ease with ADLs   Min Type Additional Details   15 [x] Estim:  [x]Unatt       [x]IFC  []Premod                        []Other:  []w/ice   [x]w/heat  Position: seated  Location:low back    [] Estim: []Att    []TENS instruct  []NMES                    []Other:  []w/US   []w/ice   []w/heat  Position:  Location:    []  Traction: [] Cervical       []Lumbar                       [] Prone          []Supine                       []Intermittent   []Continuous Lbs:  [] before manual  [] after manual    []  Ultrasound: []Continuous   [] Pulsed                           []1MHz   []3MHz W/cm2:  Location:    []  Iontophoresis with dexamethasone         Location: [] Take home patch   [] In clinic    []  Ice     []  heat  []  Ice massage  []  Laser   []  Anodyne Position:  Location:    []  Laser with stim  []  Other:  Position:  Location:    []  Vasopneumatic Device Pressure:       [] lo [] med [] hi   Temperature: [] lo [] med [] hi   [x] Skin assessment post-treatment:  [x]intact []redness- no adverse reaction    []redness  adverse reaction:       25 min Therapeutic Exercise:  [x] See flow sheet : 10/17/2017 9:00 AM Henna Joyner PTA MMCPTPB [facility]   10/18/2017 9:00 AM Clay Hopson MMCPTPB [facility]   10/23/2017 7:30 AM Henna Joyner PTA [id number] [facility]   10/25/2017 7:30 AM Henna Joyner PTA [id number] [facility]   10/31/2017 7:30 AM Henna Joyner PTA [id number] [facility]   11/1/2017 8:00 AM Ilda Hopson MMCPTPB [facility]

## 2017-10-17 ENCOUNTER — HOSPITAL ENCOUNTER (OUTPATIENT)
Dept: PHYSICAL THERAPY | Age: 52
Discharge: HOME | End: 2017-10-17
Payer: COMMERCIAL

## 2017-10-17 PROCEDURE — 97110 THERAPEUTIC EXERCISES: CPT

## 2017-10-17 PROCEDURE — 97014 ELECTRIC STIMULATION THERAPY: CPT

## 2017-10-17 NOTE — PROGRESS NOTES
PT DAILY TREATMENT NOTE     Patient Name: Ti Shirley  Date:10/17/2017  : 1965  [x]  Patient  Verified  Payor: [employer] Asad / Plan: Kenrick Mireles / Product Type: Federal Funded Programs /    In time:909  Out time:946  Total Treatment Time (min): 37  Visit #: 3 of 8    Treatment Area: Back spasm [M62.830]    SUBJECTIVE  Pain Level (0-10 scale): 6/10  Any medication changes, allergies to medications, adverse drug reactions, diagnosis change, or new procedure performed?: [x] No    [] Yes (see summary sheet for update)  Subjective functional status/changes:   [] No changes reported  Pt stated that she is a little slow today and has some increased pain    OBJECTIVE    Modality rationale: decrease pain and increase tissue extensibility to improve the patients ability to increase ease with ADLs   Min Type Additional Details   15 [x] Estim:  [x]Unatt       [x]IFC  []Premod                        []Other:  []w/ice   [x]w/heat  Position: prone  Location:low back    [] Estim: []Att    []TENS instruct  []NMES                    []Other:  []w/US   []w/ice   []w/heat  Position:  Location:    []  Traction: [] Cervical       []Lumbar                       [] Prone          []Supine                       []Intermittent   []Continuous Lbs:  [] before manual  [] after manual    []  Ultrasound: []Continuous   [] Pulsed                           []1MHz   []3MHz W/cm2:  Location:    []  Iontophoresis with dexamethasone         Location: [] Take home patch   [] In clinic    []  Ice     []  heat  []  Ice massage  []  Laser   []  Anodyne Position:  Location:    []  Laser with stim  []  Other:  Position:  Location:    []  Vasopneumatic Device Pressure:       [] lo [] med [] hi   Temperature: [] lo [] med [] hi   [x] Skin assessment post-treatment:  [x]intact []redness- no adverse reaction    []redness  adverse reaction:     22 min Therapeutic Exercise:  [x] See flow sheet :   Rationale: increase ROM and increase strength to improve the patients ability to increase ease with ADLs    With   [x] TE   [] TA   [] neuro   [] other: Patient Education: [x] Review HEP    [] Progressed/Changed HEP based on:   [] positioning   [] body mechanics   [] transfers   [] heat/ice application    [] other:      Other Objective/Functional Measures:   Pt was 9 minutes late for session  Pt had increased pain with hip ext bilaterally  Pt put minimal effort into exercises today  Pt complained of increased low back pain with all standing exercises  Added some supine exercises today 2* pt's inability to tolerate standing activities       Pain Level (0-10 scale) post treatment: 5/10    ASSESSMENT/Changes in Function:   Pt is making limited progress toward goals. Pt cont with moderate pain levels. Pt cont with decreased core strength which is contributing to her low back pain. Cont with decreased strength in B hips     Patient will continue to benefit from skilled PT services to modify and progress therapeutic interventions, address functional mobility deficits, address ROM deficits and address strength deficits to attain remaining goals. [x]  See Plan of Care  []  See progress note/recertification  []  See Discharge Summary         Progress towards goals / Updated goals:  Short term goals: To be accomplished within 1 week                        3. Pt will be independent with HEP to maintain progression. Goal met. 10/13/17  Long term goals: To be accomplished within 4 weeks                        1. Pt will improve FOTO score by 7 points to show improvement with functional mobility performance.                        2. Pt will report no more than 6/10 pain to improve her QOL.                         7. Pt will have B hips strength at least 4-/5 to improve amb tolerance.                        4. Pt will demonstrate good core contraction/maintain TA draw for amb/ADLs performance to improve her posture for physical activities performance.     PLAN  []  Upgrade activities as tolerated     [x]  Continue plan of care  []  Update interventions per flow sheet       []  Discharge due to:_  []  Other:_      Claudell Dye, PTA 10/17/2017  9:10 AM    Future Appointments  Date Time Provider Merly Ennis   10/18/2017 9:00 AM Markelromel Phillip Childmilton USEVOJJ [facility]   10/23/2017 7:30 AM Claudell Dye, PTA QRURGNR [facility]   10/25/2017 7:30 AM Claudell Dye, PTA [id number] [facility]   10/31/2017 7:30 AM Claudell Dye, PTA CYUEUJT [facility]   11/1/2017 8:00 AM Clay Kenji Del Real [id number] [facility]

## 2017-10-18 ENCOUNTER — HOSPITAL ENCOUNTER (OUTPATIENT)
Dept: PHYSICAL THERAPY | Age: 52
Discharge: HOME | End: 2017-10-18
Payer: COMMERCIAL

## 2017-10-18 PROCEDURE — 97530 THERAPEUTIC ACTIVITIES: CPT

## 2017-10-18 PROCEDURE — 97110 THERAPEUTIC EXERCISES: CPT

## 2017-10-18 PROCEDURE — 97014 ELECTRIC STIMULATION THERAPY: CPT

## 2017-10-18 NOTE — PROGRESS NOTES
PT DAILY TREATMENT NOTE     Patient Name: Briana Saenz  Date:10/18/2017  : 1965  [x]  Patient  Verified  Payor: [employer] Asad / Plan: [address] / Product Type: Federal Funded Programs /    In time: 9:04  Out time: 9:55  Total Treatment Time (min): 41  Visit #: 4 of 8    Treatment Area: Back spasm [M62.830]    SUBJECTIVE  Pain Level (0-10 scale): 7/10  Any medication changes, allergies to medications, adverse drug reactions, diagnosis change, or new procedure performed?: [x] No    [] Yes (see summary sheet for update)  Subjective functional status/changes:   [] No changes reported  Pt reported having bad pain during driving last time    OBJECTIVE  Modality rationale: decrease pain and increase tissue extensibility to improve the patients ability to increase ease with ADLs   Min Type Additional Details   15 [x] Estim:  [x]Unatt       [x]IFC  []Premod                        []Other:  []w/ice   [x]w/heat  Position: prone  Location:low back     [] Estim: []Att    []TENS instruct  []NMES                    []Other:  []w/US   []w/ice   []w/heat  Position:  Location:     []  Traction: [] Cervical       []Lumbar                       [] Prone          []Supine                       []Intermittent   []Continuous Lbs:  [] before manual  [] after manual     []  Ultrasound: []Continuous   [] Pulsed                           []1MHz   []3MHz W/cm2:  Location:     []  Iontophoresis with dexamethasone         Location: [] Take home patch   [] In clinic     []  Ice     []  heat  []  Ice massage  []  Laser   []  Anodyne Position:  Location:     []  Laser with stim  []  Other:  Position:  Location:     []  Vasopneumatic Device Pressure:       [] lo [] med [] hi   Temperature: [] lo [] med [] hi   [x] Skin assessment post-treatment:  [x]intact []redness- no adverse reaction    []redness  adverse reaction:      16 min Therapeutic Exercise:  [x] See flow sheet :   Rationale: increase ROM and increase strength to improve the patients ability to increase ease with ADLs    10 min Therapeutic activity: attempt MET and pt edu on pelvic alignment, discussed pt's status and prognosis within scope of practice    With   [] TE   [] TA   [] neuro   [] other: Patient Education: [x] Review HEP    [] Progressed/Changed HEP based on:   [] positioning   [] body mechanics   [] transfers   [] heat/ice application    [] other:      Other Objective/Functional Measures: Mod pain with therex; unable to tolerate all assigned therex   Unable to tolerate MET to correct R AI/L PI      Pain Level (0-10 scale) post treatment: 7/10    ASSESSMENT/Changes in Function: pt cont to make limited progress. She demonstrated mal-alignment of pelvic today but unable tolerate supine position for correction. Pt reported that she got new imaging and will bring the result next visit. Patient will continue to benefit from skilled PT services to modify and progress therapeutic interventions, address functional mobility deficits, address ROM deficits and address strength deficits to attain remaining goals.      [x]  See Plan of Care  []  See progress note/recertification  []  See Discharge Summary      Progress towards goals / Updated goals:  Short term goals: To be accomplished within 1 week                        1. Pt will be independent with HEP to maintain progression. Goal met. 10/13/17  Long term goals: To be accomplished within 4 weeks                        1. Pt will improve FOTO score by 7 points to show improvement with functional mobility performance.                        2. Pt will report no more than 6/10 pain to improve her QOL.  Not met, 10-18-17                        3. Pt will have B hips strength at least 4-/5 to improve amb tolerance.                        4. Pt will demonstrate good core contraction/maintain TA draw for amb/ADLs performance to improve her posture for physical activities performance.     PLAN  []  Upgrade activities as tolerated     [x]  Continue plan of care  []  Update interventions per flow sheet       []  Discharge due to:_  []  Other:_       Dequan Mckinney, PT 10/18/2017  7:48 AM    Future Appointments  Date Time Provider Merly Ennis   10/18/2017 9:00 AM Clay Mccloud [id number] [facility]   10/23/2017 7:30 AM Lourdes Lara PTA MMCPTPB [facility]   10/25/2017 7:30 AM Lourdes Lara PTA [id number] [facility]   10/31/2017 7:30 AM Lourdes Lara PTA [id number] [facility]   11/1/2017 8:00 AM Clay Mccloud MMCPTPB [facility]

## 2017-10-23 ENCOUNTER — HOSPITAL ENCOUNTER (OUTPATIENT)
Dept: PHYSICAL THERAPY | Age: 52
Discharge: HOME | End: 2017-10-23
Payer: COMMERCIAL

## 2017-10-25 ENCOUNTER — HOSPITAL ENCOUNTER (OUTPATIENT)
Dept: PHYSICAL THERAPY | Age: 52
Discharge: HOME | End: 2017-10-25
Payer: COMMERCIAL

## 2017-10-25 PROCEDURE — 97110 THERAPEUTIC EXERCISES: CPT

## 2017-10-25 PROCEDURE — 97140 MANUAL THERAPY 1/> REGIONS: CPT

## 2017-10-25 NOTE — PROGRESS NOTES
[facility] Raenette Camera  [address] [facility]  [phone] fax    Physical Therapy Progress Note     Patient name: Ti Shirley Start of Care: 10/9/2017   Referral source: Aleksey Storm NP :                           Medical Diagnosis: Back spasm [M62.830] Onset Date:chronic                          Treatment Diagnosis: LBP   Prior Hospitalization: see medical history Provider#: [id number]   Medications: Verified on Patient summary List    Comorbidities: fibromyalgia, depression, chronic back pain   Prior Level of Function: less pain with ADLs/mobility, living in 3 story house                     Visits from Start of Care: 5    Missed Visits: 1    Established Goals:         Excellent           Good         Limited           None  [] Increased ROM   []  []  []  []  [x] Increased Strength  []  []  []  [x]  [x] Increased Mobility  []  []  []  [x]   [x] Decreased Pain   []  []  [x]  []  [] Decreased Swelling  []  []  []  []    Key Functional Changes: improving awareness of posture and pelvic alignment, improving pain at the end of PT session but poor carry over    Updated Goals: to be achieved in 4 weeks:   Long term goals: To be accomplished within 4 weeks                        7. Pt will improve FOTO score by 7 points to show improvement with functional mobility performance.                         2. Pt will report no more than 6/10 pain to improve her QOL.                       2. Pt will demonstrate good core contraction/maintain TA draw for amb/ADLs performance to improve her posture for physical activities performance.     4. .Pt will have B hips strength at least 4-/5 to improve amb tolerance. ASSESSMENT/RECOMMENDATIONS: pt making slow progress towards goals; cont to have mod difficulty with therex due to mod pain.  Able to tolerate manual correction for pelvic alignment today, resolved after 3x MET but pt reported min change of pain level and then increased pain again after 1 more therex. Would cont to attempt skilled PT for 4 more weeks; if pt cont to make limitated progress, will refer back to MD for further instructions. [x]Continue therapy per initial plan/protocol at a frequency of  2 x per week for 4 weeks  []Continue therapy with the following recommended changes:_____________________      _____________________________________________________________________  []Discontinue therapy progressing towards or have reached established goals  []Discontinue therapy due to lack of appreciable progress towards goals  []Discontinue therapy due to lack of attendance or compliance  []Await Physician's recommendations/decisions regarding therapy  []Other:________________________________________________________________    Thank you for this referral.   Chico Kessler PT 10/25/2017 9:04 AM    NOTE TO PHYSICIAN:  PLEASE COMPLETE THE ORDERS BELOW AND   FAX TO [facility]: [phone]  If you are unable to process this request in 24 hours please contact our office: [phone] I have read the above report and request that my patient continue as recommended. ? I have read the above report and request that my patient continue therapy with the following changes/special instructions:____________________________________  ? I have read the above report and request that my patient be discharged from therapy.     Physicians signature: ______________________________Date: ______Time:______

## 2017-10-25 NOTE — PROGRESS NOTES
PT DAILY TREATMENT NOTE     Patient Name: Libby Duarte  Date:10/25/2017  : 1965  [x]  Patient  Verified  Payor: [employer] / Plan: [address] / Product Type: Federal Funded Programs /    In time: 8:01  Out time:8:33  Total Treatment Time (min): 32  Visit #: 5 of 8    Treatment Area: Back spasm [M62.830]    SUBJECTIVE  Pain Level (0-10 scale): 10  Any medication changes, allergies to medications, adverse drug reactions, diagnosis change, or new procedure performed?: [x] No    [] Yes (see summary sheet for update)  Subjective functional status/changes:   [] No changes reported  Pt reported having pain med so feeling a little better today    OBJECTIVE       24 min Therapeutic Exercise:  [x] See flow sheet :   Rationale: increase ROM and increase strength to improve the patients ability to increase ease with ADLs     8 min Manual: MET to correct R AI and shotgun 3x      With   [] TE   [] TA   [] neuro   [] other: Patient Education: [x] Review HEP    [] Progressed/Changed HEP based on:   [] positioning   [] body mechanics   [] transfers   [] heat/ice application    [] other:      Other Objective/Functional Measures:    Min verbal cues for appropriate form with therex   Mod R AI   Pt tolerated therex and manual better today   Pt hit her hand into a fan at home, went to ER for X-ray    Updated imaging result; MRI of l/s 2017 showing min multilevel degenerative disc disease, most notable at L4-5, L5-S1, loss lumbar lordotic. FOTO: 33    Pain Level (0-10 scale) post treatment: 5/10    ASSESSMENT/Changes in Function: pt making slow progress towards goals; cont to have mod difficulty with therex due to mod pain. Able to tolerate manual correction for pelvic alignment today, resolved after 3x MET but pt reported min change of pain level and then increased pain again after 1 more therex. Will discuss limited progression and POC with pt.     Patient will continue to benefit from skilled PT services to modify and progress therapeutic interventions, address functional mobility deficits, address ROM deficits and address strength deficits to attain remaining goals.      [x]  See Plan of Care  []  See progress note/recertification  []  See Discharge Summary      Progress towards goals / Updated goals:  Short term goals: To be accomplished within 1 week                        1. Pt will be independent with HEP to maintain progression. Goal met. 10/13/17  Long term goals: To be accomplished within 4 weeks                        1. Pt will improve FOTO score by 7 points to show improvement with functional mobility performance. Not met, decreased significantly 10-25-17                        9. Pt will report no more than 6/10 pain to improve her QOL.  Not met, 10-18-17                        3. Pt will have B hips strength at least 4-/5 to improve amb tolerance.                        4. Pt will demonstrate good core contraction/maintain TA draw for amb/ADLs performance to improve her posture for physical activities performance.      PLAN  []  Upgrade activities as tolerated     [x]  Continue plan of care  []  Update interventions per flow sheet       []  Discharge due to:_  []  Other:_      Ismael Quijano, PT 10/25/2017  7:50 AM    Future Appointments  Date Time Provider Merly Ennis   10/25/2017 8:00 AM Clay Romero [id number] [facility]   10/31/2017 7:30 AM Sona Pro PTA [id number] [facility]   10/31/2017 2:40 PM Gen Leonard NP [facility]   11/1/2017 8:00 AM Clay Romero [id number] [facility]

## 2017-10-31 ENCOUNTER — HOSPITAL ENCOUNTER (OUTPATIENT)
Dept: PHYSICAL THERAPY | Age: 52
End: 2017-10-31
Payer: COMMERCIAL

## 2017-10-31 ENCOUNTER — OFFICE VISIT (OUTPATIENT)
Dept: FAMILY MEDICINE CLINIC | Age: 52
End: 2017-10-31

## 2017-10-31 VITALS
WEIGHT: 207.2 LBS | DIASTOLIC BLOOD PRESSURE: 89 MMHG | TEMPERATURE: 97.8 F | BODY MASS INDEX: 34.52 KG/M2 | SYSTOLIC BLOOD PRESSURE: 147 MMHG | HEART RATE: 71 BPM | RESPIRATION RATE: 16 BRPM | HEIGHT: 65 IN | OXYGEN SATURATION: 98 %

## 2017-10-31 DIAGNOSIS — M79.601 RIGHT ARM PAIN: ICD-10-CM

## 2017-10-31 DIAGNOSIS — S60.221D TRAUMATIC HEMATOMA OF RIGHT HAND, SUBSEQUENT ENCOUNTER: Primary | ICD-10-CM

## 2017-10-31 NOTE — MR AVS SNAPSHOT
Visit Information Date & Time Provider Department Dept. Phone Encounter #  
 10/31/2017  2:40 PM Rocky Calderon NP Sh[facility]fta Lala [address] [facility] [id number] Follow-up Instructions Return if symptoms worsen or fail to improve. Upcoming Health Maintenance Date Due Pneumococcal 19-64 Highest Risk (1 of 3 - PCV13) 6/23/1983 INFLUENZA AGE 9 TO ADULT 8/1/2017 PAP AKA CERVICAL CYTOLOGY 7/1/2018 BREAST CANCER SCRN MAMMOGRAM 6/23/2019 COLONOSCOPY 6/21/2021 DTaP/Tdap/Td series (2 - Td) 4/25/2027 Allergies as of 10/31/2017  Review Complete On: 10/31/2017 By: Raymond Sensor Severity Noted Reaction Type Reactions Levaquin [Levofloxacin]  03/01/2010   Topical Rash, Itching Current Immunizations  Reviewed on 12/16/2016 Name Date Influenza Vaccine 11/16/2016, 11/15/2013 Influenza Vaccine (Quad) PF 8/25/2015, 9/29/2014 Influenza Vaccine Split 10/17/2012 PPD 7/26/2011 TB Skin Test (PPD) Intradermal 12/16/2016, 3/16/2015  2:25 PM  
 Tdap 4/25/2017 Not reviewed this visit You Were Diagnosed With   
  
 Codes Comments Traumatic hematoma of right hand, subsequent encounter    -  Primary ICD-10-CM: B74.357R ICD-9-CM: V58.89, 923.20 Right arm pain     ICD-10-CM: T45.718 ICD-9-CM: 729.5 Vitals BP Pulse Temp Resp Height(growth percentile) Weight(growth percentile) 147/89 (BP 1 Location: Left arm, BP Patient Position: Sitting) 71 97.8 °F (36.6 °C) (Oral) 16 5' 5\" (1.651 m) 207 lb 3.2 oz (94 kg) LMP SpO2 BMI OB Status Smoking Status 01/30/2016 98% 34.48 kg/m2 Hysterectomy Never Smoker BMI and BSA Data Body Mass Index Body Surface Area 34.48 kg/m 2 2.08 m 2 Preferred Pharmacy Pharmacy Name Phone [id number] [place] [facility] [phone] Your Updated Medication List  
  
   
 This list is accurate as of: 10/31/17  3:57 PM.  Always use your most recent med list.  
  
  
  
  
 amphetamine-dextroamphetamine XR 20 mg XR capsule Commonly known as:  ADDERALL XR Take 40 mg by mouth daily. cetirizine 10 mg tablet Commonly known as:  ZYRTEC Take 1 Tab by mouth nightly as needed for Allergies. citalopram 10 mg tablet Commonly known as:  Yu Laughlin Take  by mouth daily. gabapentin 300 mg capsule Commonly known as:  NEURONTIN Take two pill by mouth nightly  
  
 naproxen 500 mg tablet Commonly known as:  NAPROSYN  
1 tab PO every day to BID with food PRN pain  
  
 traMADol 50 mg tablet Commonly known as:  ULTRAM  
Take 1 Tab by mouth every six (6) hours as needed for Pain. Max Daily Amount: 200 mg. Indications: Pain Follow-up Instructions Return if symptoms worsen or fail to improve. To-Do List   
 10/31/2017 Imaging:  DUPLEX UPPER EXT VENOUS RIGHT   
  
 11/01/2017 8:00 AM  
  Appointment with [facility] at [facility] ([phone]) Please have patient bring a copy of their order if same day add-on. It can also be faxed to [place] [facility] - [phone]. No preparation is required for this study. Patient can have their meals and take their medications. Please report to the main location @ [place] [address] [address] at least 15 minutes prior to your appointment time. The  is located on the RIGHT side of the street, immediately adjacent to the Emergency Room. Introducing [facility]! Dear Lg Jean Baptiste: Thank you for requesting a [employer] account. Our records indicate that you already have an active [employer] account. You can access your account anytime at [email or web address] Did you know that you can access your hospital and ER discharge instructions at any time in [employer]? You can also review all of your test results from your hospital stay or ER visit. Additional Information If you have questions, please visit the Frequently Asked Questions section of the [employer] website at [email or web address] Remember, [employer] is NOT to be used for urgent needs. For medical emergencies, dial 911. Now available from your iPhone and Android! Please provide this summary of care documentation to your next provider. Your primary care clinician is listed as Ellis Cervantes. If you have any questions after today's visit, please call [phone].

## 2017-11-01 ENCOUNTER — APPOINTMENT (OUTPATIENT)
Dept: PHYSICAL THERAPY | Age: 52
End: 2017-11-01
Payer: COMMERCIAL

## 2017-11-01 ENCOUNTER — HOSPITAL ENCOUNTER (OUTPATIENT)
Dept: VASCULAR SURGERY | Age: 52
Discharge: HOME | End: 2017-11-01
Attending: NURSE PRACTITIONER
Payer: COMMERCIAL

## 2017-11-01 DIAGNOSIS — M79.601 RIGHT ARM PAIN: ICD-10-CM

## 2017-11-01 PROCEDURE — 93971 EXTREMITY STUDY: CPT

## 2017-11-01 NOTE — PROCEDURES
[facility]  *** FINAL REPORT ***    Name: Deepa Swann  MRN: [id number]    Outpatient  : 1965  HIS Order #: [id number]  [address] [facility] Visit #: [id number]  Date: 2017    TYPE OF TEST: Peripheral Venous Testing    REASON FOR TEST  Pain in limb, Limb swelling    Right Arm:-  Deep venous thrombosis:           No  Superficial venous thrombosis:    No      INTERPRETATION/FINDINGS  Duplex images were obtained using 2-D gray scale, color flow, and  spectral Doppler analysis. Right arm :  1. Deep vein(s) visualized include the internal jugular, subclavian,  axillary and brachial veins. 2. No evidence of deep venous thrombosis detected in the veins  visualized. 3. No evidence of deep vein thrombosis in the contralateral internal  jugular and subclavian veins. 4. No evidence of deep vein thrombosis in the contralateral internal  jugular vein. 5. No evidence of deep vein thrombosis in the contralateral subclavian   vein. 6. Superficial vein(s) visualized include the basilic (upper arm),  basilic (forearm), cephalic (upper arm) and cephalic (forearm) veins. 7. No evidence of superficial thrombosis detected. ADDITIONAL COMMENTS    I have personally reviewed the data relevant to the interpretation of  this  study. TECHNOLOGIST: Debra Ireland, [facility], RVT/  Signed: 2017 08:22 AM    PHYSICIAN: Lulu Richter MD  Signed: 2017 05:12 PM

## 2017-11-01 NOTE — PROGRESS NOTES
1. Have you been to the ER, urgent care clinic since your last visit? Hospitalized since your last visit? Yes, 10-20-17 [facility]    2. Have you seen or consulted any other health care providers outside of the [address] [place] [address] since your last visit? Include any pap smears or colon screening. [facility]  Subjective:   Isha Ray is a 47 y.o. female presents to office today for emergency room follow up 10-20-17 accidentally right hand was caught in a spinning hand. She complains of pain on dorsum of right hand only with palpation. Not concurrently taking any pain medications. Hematoma has decreased per patient report. She is concerned over unusual aching in her right upper extremity for the past two days. No other associated symptoms. ROS: denies right shoulder pain, chest pain, edema RUE, denies erythema to RUE, denies dyspnea    ED Provider Notes - Clayton Ballesteros MD - 10/20/2017 2:20 PM EDT  Formatting of this note may be different from the original.  [address] [place] [address]    Time of Arrival: 10/20/2017 2:09 PM     (S69.91XA) Hand injury, right, initial encounter    Assessment/Differential Diagnosis: Contusion vs fracture    ED Course/Medical Decision Makinyear old female with right hand contusion and abrasion. Radiographs negative. Will treat with analgesics, ice, and elevation pending follow up    ED Course   . Pre-Hospital/Procedures/Consults: None    . Disposition: Home    Discharge Medication List as of 10/20/2017 2:35 PM     START taking these medications   Details   diclofenac potassium (CATAFLAM) 50 mg PO TABS Take 1 Tab by Mouth 3 Times Daily for 7 days. NPI #[id number], Disp-21 Tab, R-0, Normal     traMADol (ULTRAM) 50 mg PO TABS Take 1 Tab by Mouth 4 Times Daily for 5 days.  NPI #[id number], Disp-20 Tab, R-0, Print   Chief Complaint   Patient presents with    HAND INJURY     Isha Ray is a 47 y.o. female who complains of right hand pain for 20 minutes. Patient states she injured her hand at home by accidentally coming into contact with moving ceiling fan. Pain is localized, constant, sharp, and moderate-to-severe. She noted a bruise and abrasion. Pertinant negatives include no pulsatile bleeding, sensory change, or focal weakness. She is right handed. Last tetanus booster was 2014. Diagnostics:  Labs: No results found for this visit on 10/20/17. ECG:  No results found for this visit on 10/20/17. Rhythm interpretation from monitor: N/A    HAND COMPLETE RT   Final Result   IMPRESSION:     No acute fractures or subluxation of the right Hand. Current Outpatient Prescriptions   Medication Sig Dispense Refill    naproxen (NAPROSYN) 500 mg tablet 1 tab PO every day to BID with food PRN pain 60 Tab 1    gabapentin (NEURONTIN) 300 mg capsule Take two pill by mouth nightly 180 Cap 1    cetirizine (ZYRTEC) 10 mg tablet Take 1 Tab by mouth nightly as needed for Allergies. 30 Tab 3    amphetamine-dextroamphetamine XR (ADDERALL XR) 20 mg XR capsule Take 40 mg by mouth daily. 0    citalopram (CELEXA) 10 mg tablet Take  by mouth daily.  traMADol (ULTRAM) 50 mg tablet Take 1 Tab by mouth every six (6) hours as needed for Pain. Max Daily Amount: 200 mg. Indications: Pain 40 Tab 0        Objective:   Awake and alert in no acute distress  Lungs clear throughout  S1 S2 RRR without ectopy or murmur auscultated. RUE +2 pulses radial, ulnar and brachial   Musculoskeletal: TTP right upper extremity no visible edema, no erythema, TTP dorsum of right hand   No limited ROM to hand or upper extremity  Integumentary: hematoma dorsum of right hand   Visit Vitals    /89 (BP 1 Location: Left arm, BP Patient Position: Sitting)    Pulse 71    Temp 97.8 °F (36.6 °C) (Oral)    Resp 16    Ht 5' 5\" (1.651 m)    Wt 207 lb 3.2 oz (94 kg)    LMP 01/30/2016    SpO2 98%    BMI 34.48 kg/m2      Diagnoses and all orders for this visit:    1.  Traumatic hematoma of right hand, subsequent encounter    2. Right arm pain  -     DUPLEX UPPER EXT VENOUS RIGHT; Future    Anticipatory guidance regarding emergency care if symptoms worsen and patient verbalizes understanding. General comfort measures  Patient verbalizes understanding. I have discussed the diagnosis with the patient and the intended plan as seen in the above orders. The patient has received an after-visit summary and questions were answered concerning future plans. I have discussed medication side effects and warnings with the patient as well. Follow-up Disposition:  Return if symptoms worsen or fail to improve.

## 2017-11-10 ENCOUNTER — HOSPITAL ENCOUNTER (OUTPATIENT)
Dept: PHYSICAL THERAPY | Age: 52
End: 2017-11-10
Payer: COMMERCIAL

## 2017-11-22 ENCOUNTER — HOSPITAL ENCOUNTER (OUTPATIENT)
Dept: PHYSICAL THERAPY | Age: 52
Discharge: HOME | End: 2017-11-22
Payer: COMMERCIAL

## 2017-11-22 PROCEDURE — 97164 PT RE-EVAL EST PLAN CARE: CPT

## 2017-11-22 PROCEDURE — 97110 THERAPEUTIC EXERCISES: CPT

## 2017-11-22 NOTE — PROGRESS NOTES
[facility] Shadia Cardinal  [address] [facility]  [phone] fax    Physical Therapy Progress Note  Patient name: Erin Bullard Start of Care: 10/9/2017   Referral source: Mateusz Montes NP :                           Medical Diagnosis: Back spasm [M62.830] Onset Date:chronic                          Treatment Diagnosis: LBP   Prior Hospitalization: see medical history Provider#: [id number]   Medications: Verified on Patient summary List    Comorbidities: fibromyalgia, depression, chronic back pain   Prior Level of Function: less pain with ADLs/mobility, living in 3 story house          Visits from Start of Care: 7    Missed Visits: 4    Established Goals:         Excellent           Good         Limited           None  [] Increased ROM   []  []  []  []  [x] Increased Strength  []  []  []  [x]  [x] Increased Mobility  []  []  []  [x]   [x] Decreased Pain   []  []  [x]  []  [] Decreased Swelling  []  []  []  []    Key Functional Changes: fluctuating pain level, improving knowledge with posture  Updated Goals: to be achieved in 3 weeks:  Long term goals: To be accomplished within 3 weeks   1. Pt will improve FOTO score by 7 points to show improvement with functional mobility performance. 2. Pt will report no more than 6/10 pain to improve her QOL.    3. Pt will demonstrate good core contraction/maintain TA draw for amb/ADLs performance to improve her posture for physical activities performance. ASSESSMENT/RECOMMENDATIONS: pt return for pt treatment after ~1 month due to personal reason and ankles pain (pt got shot for this recently). Pt cont to be limited with poor core and B LEs strength, poor posture, mal alignment of pelvic, poor tolerance to therex/activity and increased pain with prolonged physical activity. Pt remained fairly   motivated and requested to cont PT after getting shot for ankles.  She would cont to benefit from skilled PT to address her limitations and maximize pain free functional mobility. [x]Continue therapy per initial plan/protocol at a frequency of  2 x per week for 3 weeks  []Continue therapy with the following recommended changes:_____________________      _____________________________________________________________________  []Discontinue therapy progressing towards or have reached established goals  []Discontinue therapy due to lack of appreciable progress towards goals  []Discontinue therapy due to lack of attendance or compliance  []Await Physician's recommendations/decisions regarding therapy  []Other:________________________________________________________________    Thank you for this referral.   Omar Perry, PT 11/22/2017 1:42 PM    NOTE TO PHYSICIAN:  [address] AND   FAX TO [facility]: [phone]  If you are unable to process this request in 24 hours please contact our office: [phone]    ? I have read the above report and request that my patient continue as recommended. ? I have read the above report and request that my patient continue therapy with the following changes/special instructions:____________________________________  ? I have read the above report and request that my patient be discharged from therapy.     Physicians signature: ______________________________Date: ______Time:______

## 2017-11-22 NOTE — PROGRESS NOTES
PT DAILY TREATMENT NOTE     Patient Name: Veronica Ziegler  Date:2017  : 1965  [x]  Patient  Verified  Payor: [employer] / Plan: [address] / Product Type: Federal Funded Programs /    In time: 11:37  Out time: 11:58  Total Treatment Time (min): 21  Visit #: 6 of 8    Treatment Area: Back spasm [M62.830]    SUBJECTIVE  Pain Level (0-10 scale): 5-10  Any medication changes, allergies to medications, adverse drug reactions, diagnosis change, or new procedure performed?: [x] No    [] Yes (see summary sheet for update)  Subjective functional status/changes:   [] No changes reported  Pt got 2 cortisone shot in her heels     OBJECTIVE    13 min Re-Eval and manual to correct L upslip to improve pt's tolerance to ADLs/amb    8 min Therapeutic Exercise:  [x] See flow sheet :   Rationale: increase ROM and increase strength to improve the patients ability to increase ease with ADLs            With   [] TE   [] TA   [] neuro   [] other: Patient Education: [x] Review HEP    [] Progressed/Changed HEP based on:   [] positioning   [] body mechanics   [] transfers   [] heat/ice application    [] other:      Other Objective/Functional Measures:    Hips strength: grossly 3/5 with flex, 3-/5 with ext, IR, ER   L upslip, resolved after 2x MET but pt reported no significant change with symptom   Mod fatigue and poor tolerance with all activities    Pain Level (0-10 scale) post treatment: 5/10    ASSESSMENT/Changes in Function: see progress note please.      Patient will continue to benefit from skilled PT services to modify and progress therapeutic interventions, address functional mobility deficits, address ROM deficits and address strength deficits to attain remaining goals.      [x]  See Plan of Care  []  See progress note/recertification  []  See Discharge Summary      Progress towards goals / Updated goals:  Short term goals: To be accomplished within 1 week                        9. Pt will be independent with HEP to maintain progression. Goal met. 10/13/17  Long term goals: To be accomplished within 4 weeks                        1. Pt will improve FOTO score by 7 points to show improvement with functional mobility performance. Not met, decreased significantly 10-25-17                        5. Pt will report no more than 6/10 pain to improve her QOL. Not met, 10-18-17, progressing, fluctuating 4-6/10 11-                        3. Pt will have B hips strength at least 4-/5 to improve amb tolerance. not met, cont to be limited partially by pain 11-                        4. Pt will demonstrate good core contraction/maintain TA draw for amb/ADLs performance to improve her posture for physical activities performance.  Not met, max challenged with maintaining TA for therex 11-22-17      PLAN  []  Upgrade activities as tolerated     [x]  Continue plan of care  []  Update interventions per flow sheet       []  Discharge due to:_  []  Other:_         Js Parikh, PT 11/22/2017  7:53 AM    Future Appointments  Date Time Provider Merly Ennis   11/22/2017 2:00 PM Clay [facility]

## 2017-12-01 ENCOUNTER — APPOINTMENT (OUTPATIENT)
Dept: PHYSICAL THERAPY | Age: 52
End: 2017-12-01
Payer: COMMERCIAL

## 2017-12-04 ENCOUNTER — HOSPITAL ENCOUNTER (OUTPATIENT)
Dept: PHYSICAL THERAPY | Age: 52
Discharge: HOME | End: 2017-12-04
Payer: COMMERCIAL

## 2017-12-04 PROCEDURE — 97110 THERAPEUTIC EXERCISES: CPT

## 2017-12-04 PROCEDURE — 97140 MANUAL THERAPY 1/> REGIONS: CPT

## 2017-12-04 NOTE — PROGRESS NOTES
PT DAILY TREATMENT NOTE     Patient Name: Kenia Sher  Date:2017  : 1965  [x]  Patient  Verified  Payor: [employer]ad / Plan: [address] / Product Type: Federal Funded Programs /    In [address] time:829  Total Treatment Time (min): 29  Visit #: 7 of 8    Treatment Area: Back spasm [M62.830]    SUBJECTIVE  Pain Level (0-10 scale): 6/10  Any medication changes, allergies to medications, adverse drug reactions, diagnosis change, or new procedure performed?: [x] No    [] Yes (see summary sheet for update)  Subjective functional status/changes:   [] No changes reported  Pt stated that she is ok today    OBJECTIVE    21 min Therapeutic Exercise:  [x] See flow sheet :   Rationale: increase ROM and increase strength to improve the patients ability to increase ease with aDLs    8 min Manual Therapy:  Pelvic alignment check, LLD to correct L up slip and MET and shot gun to correct TROY   Rationale: decrease pain, increase ROM and increase tissue extensibility to increase ease with ADLs    With   [x] TE   [] TA   [] neuro   [] other: Patient Education: [x] Review HEP    [] Progressed/Changed HEP based on:   [] positioning   [] body mechanics   [] transfers   [] heat/ice application    [] other:      Other Objective/Functional Measures:   Pt had increased R low back pain with mini squats  Pt had difficulty with bridges and had difficulty finding a comfortable way to lay on the mat table  Pelvic correction did not help pt's pain today     Pain Level (0-10 scale) post treatment: 7/10    ASSESSMENT/Changes in Function:   Pt is making limited progress toward goals. Pt cont with moderate to severe pain levels. Pt reports that she has had no real improvement with performing ADLs.      Patient will continue to benefit from skilled PT services to modify and progress therapeutic interventions, address functional mobility deficits, address ROM deficits and address strength deficits to attain remaining goals. []  See Plan of Care  [x]  See progress note/recertification  []  See Discharge Summary         Progress towards goals / Updated goals:  Updated Goals: to be achieved in 3 weeks:  Long term goals: To be accomplished within 3 weeks                        1. Pt will improve FOTO score by 7 points to show improvement with functional mobility performance. 2. Pt will report no more than 6/10 pain to improve her QOL.                          3. Pt will demonstrate good core contraction/maintain TA draw for amb/ADLs performance to improve her posture for physical activities performance    PLAN  []  Upgrade activities as tolerated     [x]  Continue plan of care  []  Update interventions per flow sheet       []  Discharge due to:_  []  Other:_      Richelle Ramsay, PTA 12/4/2017  7:57 AM    Future Appointments  Date Time Provider Merly Ennis   12/4/2017 8:00 AM Peoples Sobia, PTA [facility]   12/6/2017 7:30 AM Richelle Ramsay, PTA [facility]   12/8/2017 9:00 AM Clay Underwood [facility]   12/11/2017 7:30 AM Peoples Ni, PTA [facility]

## 2017-12-06 ENCOUNTER — HOSPITAL ENCOUNTER (OUTPATIENT)
Dept: PHYSICAL THERAPY | Age: 52
Discharge: HOME | End: 2017-12-06
Payer: COMMERCIAL

## 2017-12-06 PROCEDURE — 97110 THERAPEUTIC EXERCISES: CPT

## 2017-12-06 NOTE — PROGRESS NOTES
PT DAILY TREATMENT NOTE     Patient Name: Isha Ray  Date:2017  : 1965  [x]  Patient  Verified  Payor: [email or web address]ad / Plan: [address] / Product Type: Federal Funded Programs /    In [address] time:806  Total Treatment Time (min): 33  Visit #: 8 of 8    Treatment Area: Back spasm [M62.830]    SUBJECTIVE  Pain Level (0-10 scale): 7/10  Any medication changes, allergies to medications, adverse drug reactions, diagnosis change, or new procedure performed?: [x] No    [] Yes (see summary sheet for update)  Subjective functional status/changes:   [] No changes reported  Pt stated that she is still having a lot of pain in her R low back    OBJECTIVE    Modality rationale: decrease pain and increase tissue extensibility to improve the patients ability to increase ease with ADLs   Min Type Additional Details    [] Estim:  []Unatt       []IFC  []Premod                        []Other:  []w/ice   []w/heat  Position:  Location:    [] Estim: []Att    []TENS instruct  []NMES                    []Other:  []w/US   []w/ice   []w/heat  Position:  Location:    []  Traction: [] Cervical       []Lumbar                       [] Prone          []Supine                       []Intermittent   []Continuous Lbs:  [] before manual  [] after manual    []  Ultrasound: []Continuous   [] Pulsed                           []1MHz   []3MHz W/cm2:  Location:    []  Iontophoresis with dexamethasone         Location: [] Take home patch   [] In clinic   10 []  Ice     [x]  heat  []  Ice massage  []  Laser   []  Anodyne Position:seated  Location:low back    []  Laser with stim  []  Other:  Position:  Location:    []  Vasopneumatic Device Pressure:       [] lo [] med [] hi   Temperature: [] lo [] med [] hi   [x] Skin assessment post-treatment:  [x]intact []redness- no adverse reaction    []redness - adverse reaction:     23 min Therapeutic Exercise:  [x] See flow sheet :   Rationale: increase ROM and increase strength to improve the patients ability to increase ease with ADLs    With   [x] TE   [] TA   [] neuro   [] other: Patient Education: [x] Review HEP    [] Progressed/Changed HEP based on:   [] positioning   [] body mechanics   [] transfers   [] heat/ice application    [] other:      Other Objective/Functional Measures:   Pt declined to perform almost half of her program today 2* pain in low back     Pain Level (0-10 scale) post treatment: 6/10    ASSESSMENT/Changes in Function:   Pt is making very limited progress toward goals. Pt cont with significant pain in her R low back. Cont with core weakness and poor posture. Patient will continue to benefit from skilled PT services to modify and progress therapeutic interventions, address functional mobility deficits, address ROM deficits and address strength deficits to attain remaining goals. []  See Plan of Care  [x]  See progress note/recertification  []  See Discharge Summary         Progress towards goals / Updated goals:  Long term goals: To be accomplished within 3 weeks                        1. Pt will improve FOTO score by 7 points to show improvement with functional mobility performance.                         2. Pt will report no more than 6/10 pain to improve her QOL.   Not met. Pt cont with 6/10 or greater pain level.  12/6/17                        3. Pt will demonstrate good core contraction/maintain TA draw for amb/ADLs performance to improve her posture for physical activities performance    PLAN  []  Upgrade activities as tolerated     [x]  Continue plan of care  []  Update interventions per flow sheet       []  Discharge due to:_  []  Other:_      Henna Joyner PTA 12/6/2017  7:36 AM    Future Appointments  Date Time Provider Merly Ennis   12/8/2017 10:30 AM Clay Hopson [facility]   12/11/2017 7:30 AM Henna Joyner PTA [facility]   12/13/2017 7:15 AM [facility] MRI RM 2 [facility]

## 2017-12-08 ENCOUNTER — HOSPITAL ENCOUNTER (OUTPATIENT)
Dept: PHYSICAL THERAPY | Age: 52
Discharge: HOME | End: 2017-12-08
Payer: COMMERCIAL

## 2017-12-08 PROCEDURE — 97530 THERAPEUTIC ACTIVITIES: CPT

## 2017-12-08 NOTE — PROGRESS NOTES
[facility] [employer]  [place]  [address] [facility]  [phone] fax    Physical Therapy Discharge Summary    Patient name: Enrique Yarbrough Start of Care: 10/9/2017   Referral source: Ridge Hernandez NP : [id number]                          Medical Diagnosis: Back spasm [M62.830] Onset Date:chronic                          Treatment Diagnosis: LBP   Prior Hospitalization: see medical history Provider#: [id number]   Medications: Verified on Patient summary List    Comorbidities: fibromyalgia, depression, chronic back pain   Prior Level of Function: less pain with ADLs/mobility, living in 3 story house            Visits from Start of Care: 10    Missed Visits: 4    Reporting Period : 10-9-2017 to 2017    Summary of Care:  Goal: Pt will improve FOTO score by 7 points to show improvement with functional mobility performance. Status at last note/certification:Not met, decreased significantly 2017  Status at discharge: not met    Goal: Pt will report no more than 6/10 pain to improve her QOL.   Status at last note/certification:Not met. Pt cont with 6/10 or greater pain level. 17  Status at discharge: not met    Goal: Pt will demonstrate good core contraction/maintain TA draw for amb/ADLs performance to improve her posture for physical activities performance   Status at last note/certification: not met 17   Status at discharge: not met      ASSESSMENT/RECOMMENDATIONS: Pt only had 2 treatments since last progress note on 2017. Pt demonstrated very min improvement with pain level at the end of PT sessions, even after manual for correcting pelvic alignment. She cont to reported >6/10 pain, 8/10 today with very poor tolerance to all therex. Pt present with very poor sitting/standing posture with max L lat trunk leaning, poor core strength, poor tolerance to all therex/activities.  Pt would like to be discharged and follow up with MD/specialist. Due to her limited progression, will be discharged at this time from PT.    [x]Discontinue therapy: []Patient has reached or is progressing toward set goals      []Patient is non-compliant or has abdicated      [x]Due to lack of appreciable progress towards set goals    Mary Shane, PT 12/8/2017 12:54 PM

## 2017-12-08 NOTE — PROGRESS NOTES
PT DAILY TREATMENT NOTE     Patient Name: Julieth Rasmussen  Date:2017  : 1965  [x]  Patient  Verified  Payor: [employer] Hwang Asad / Plan: [address] / Product Type: Federal Funded Programs /    In time:10:37  Out time:11:05  Total Treatment Time (min): 28  Visit #: 1 of 6    Treatment Area: Back spasm [M62.830]    SUBJECTIVE  Pain Level (0-10 scale): 8/10  Any medication changes, allergies to medications, adverse drug reactions, diagnosis change, or new procedure performed?: [x] No    [] Yes (see summary sheet for update)  Subjective functional status/changes:   [] No changes reported  \"I have a lot of pain, no numbness/tighnling but shooting down into my R leg    OBJECTIVE  Modality rationale: decrease pain and increase tissue extensibility to improve the patients ability to increase ease with ADLs   Min Type Additional Details     [] Estim:  []Unatt       []IFC  []Premod                        []Other:  []w/ice   []w/heat  Position:  Location:     [] Estim: []Att    []TENS instruct  []NMES                    []Other:  []w/US   []w/ice   []w/heat  Position:  Location:     []  Traction: [] Cervical       []Lumbar                       [] Prone          []Supine                       []Intermittent   []Continuous Lbs:  [] before manual  [] after manual     []  Ultrasound: []Continuous   [] Pulsed                           []1MHz   []3MHz W/cm2:  Location:     []  Iontophoresis with dexamethasone         Location: [] Take home patch   [] In clinic   During therapeutic activitiy + 5 min []  Ice     [x]  heat  []  Ice massage  []  Laser   []  Anodyne Position:seated  Location:low back     []  Laser with stim  []  Other:  Position:  Location:     []  Vasopneumatic Device Pressure:       [] lo [] med [] hi   Temperature: [] lo [] med [] hi   [x] Skin assessment post-treatment:  [x]intact []redness- no adverse reaction    []redness - adverse reaction:      5 min Therapeutic Exercise:  [x] See flow sheet :   Rationale: increase ROM and increase strength to improve the patients ability to increase ease with ADLs    15 Therapeutic Activity: pt edu within scope of practice on prognosis, progression since [facility], POC, MD follow up. With   [] TE   [] TA   [] neuro   [] other: Patient Education: [x] Review HEP    [] Progressed/Changed HEP based on:   [] positioning   [] body mechanics   [] transfers   [] heat/ice application    [] other:      Other Objective/Functional Measures:    Unable to tolerate therex, deemed to be having 9-10/10 pain due to Face Scale, as PT discussed if she needs to see MD or ER right away; pt stated \"I'll get to the ER if I need to latter then. \"    FOTO: 16    Pain Level (0-10 scale) post treatment: 7-8/10    ASSESSMENT/Changes in Function: See Discharge Summary      []  See Plan of Care  []  See progress note/recertification  [x]  See Discharge Summary      Progress towards goals / Updated goals:  Long term goals: To be accomplished within 3 weeks                        1. Pt will improve FOTO score by 7 points to show improvement with functional mobility performance. Not met, decreased significantly 12-8-2017                        2. Pt will report no more than 6/10 pain to improve her QOL.   Not met. Pt cont with 6/10 or greater pain level.  12/6/17                        3. Pt will demonstrate good core contraction/maintain TA draw for amb/ADLs performance to improve her posture for physical activities performance not met 12-8-17   PLAN  []  Upgrade activities as tolerated     []  Continue plan of care  []  Update interventions per flow sheet       [x]  Discharge due to:_  []  Other:_      Reginaldo Rodriguez, PT 12/8/2017  7:52 AM    Future Appointments  Date Time Provider Merly Ennis   12/8/2017 10:30 AM Thienphuc Meredith Ganser [facility]   12/11/2017 7:30 AM Ellis Marrero PTA [facility]   12/13/2017 7:15 AM [facility] MRI RM 2 [facility]

## 2017-12-11 ENCOUNTER — APPOINTMENT (OUTPATIENT)
Dept: PHYSICAL THERAPY | Age: 52
End: 2017-12-11
Payer: COMMERCIAL

## 2018-08-07 ENCOUNTER — HOSPITAL ENCOUNTER (OUTPATIENT)
Dept: PHYSICAL THERAPY | Age: 53
Discharge: HOME | End: 2018-08-07
Payer: OTHER GOVERNMENT

## 2018-08-07 PROCEDURE — 97110 THERAPEUTIC EXERCISES: CPT

## 2018-08-07 PROCEDURE — 97162 PT EVAL MOD COMPLEX 30 MIN: CPT

## 2018-08-07 NOTE — PROGRESS NOTES
PHYSICAL THERAPY - DAILY TREATMENT NOTE    Patient Name: Bashir Andersen        Date: 2018  : 1965   yes Patient  Verified  Visit #:     Insurance: Payor: Vanessa Vo / Plan: Danii Overcast / Product Type: Commerical /      In time: 8:03 Out time: 8:44   Total Treatment Time: 41     Medicare/BCBS Time Tracking (below)   Total Timed Codes (min):  N/A 1:1 Treatment Time:  N/A     TREATMENT AREA =  Low back pain [M54.5]    SUBJECTIVE  Pain Level (on 0 to 10 scale):  7  / 10   Medication Changes/New allergies or changes in medical history, any new surgeries or procedures?    no  If yes, update Summary List   Subjective Functional Status/Changes:  []  No changes reported     See initial eval          OBJECTIVE    10 min Therapeutic Exercise:  [x]  See flow sheet   Rationale:      increase ROM and increase strength to improve the patients ability to complete standing, sitting and transfers. N/A min Patient Education:  yes  Reviewed HEP   []  Progressed/Changed HEP based on:  Pt instructed on and given HEP to be completed daily. Pt educated on proper body mechanics with transfers. Pt instructed to amb with A device at all times to improve safety and decrease risk of falls. Pt educated on correct height for [facility] and proper use. Pt educated on signs/sxs of DVT and red flags and to seek immediate medical attention/911 if those occur. Reviewed POC and goals. Pt reports understanding. Other Objective/Functional Measures:    See initial eval     Post Treatment Pain Level (on 0 to 10) scale:   7   10     ASSESSMENT  Assessment/Changes in Function:     See initial eval     []  See Progress Note/Recertification   Patient will continue to benefit from skilled PT services to see initial eval   Progress toward goals / Updated goals:    Pt denied sharp pains or red flags with initial eval or therapeutic ex.  See initial eval.     PLAN  [x]  Upgrade activities as tolerated yes Continue plan of care   []  Discharge due to :    [x]  Other: PT 2x/week for 4-6 weeks.      Therapist: Lorene Mccallum, PT    Date: 8/7/2018 Time: 8:44 AM     Future Appointments  Date Time Provider Merly Ennis   8/9/2018 9:00 AM [facility], PT [facility]   8/15/2018 8:30 AM Federica Chen, PT [facility]   8/17/2018 9:00 AM Federica Chen, PT [facility]   8/20/2018 9:00 AM Lorene Mccallum, PT [facility]   8/24/2018 8:30 AM Federica Chen, PT [facility]   8/29/2018 8:00 AM Federica Chen, PT [facility]   8/31/2018 9:00 AM [address] [place] [address], PT [facility]

## 2018-08-07 NOTE — PROGRESS NOTES
[facility]ER[facility]  [address] [place] [address],[employer], [address] [facility] - Phone: [phone]  Fax: [phone] / [address] [facility]  Patient Name: Erin Bullard : 1965   Medical   Diagnosis: Chronic low back radicular pain  Treatment Diagnosis: Low back pain [M54.5]   Onset Date: Chronic ()     Referral Source: Mateusz Montes NP Start of Care [facility]): 2018   Prior Hospitalization: See medical history Provider #: [id number]   Prior Level of Function: Complete prolonged standing, sitting, transfers and carrying/lifting objects with increased efficiency   Comorbidities: Thyroid Condition, Intermittent Dizziness, Fall Risk, OA, Depression   Medications: Verified on Patient Summary List   The Plan of Care and following information is based on the information from the initial evaluation.   ===========================================================================================  Assessment / key information:  Pt is a 47 y/o female reporting chronic LBP with intermittent R>L radicular pain from glute to toes rated 1-10/10 that started in  while in [facility]. Pt reports pain occurred after lifting/carrying backpack for prolonged period of time. Pt reports having PT Rx intermittently over the past few years with no significant improvement in pain/sxs (last PT Rx in Oct 2017). Pt reports going to pain management with change in meds without significant improvement. Pt reports injection 1 year ago decreased pain/sxs for a few days. Pt reports she wanted to try aquatic PT, but insurance denied it. Pt reports seeing MD recently and was referred to PT at [facility]. Pt reports in past she has had massage which increases pain/sxs, so she does not want to complete massage in PT. Pt describes pain in back as pinching and stabbing with intermittent muscle spasms.  Pain increases with sitting, prolonged positioning, standing > 5-10 minutes, lifting/carrying objects, transfers and vacuuming; decreases with change in position, meds and injection temporarily. Pt reports intermittent numbness/tingling in R LE in calf area and toes (infrequent numbness/tingling L LE). Pt denies incontinence, but reports she will be seeing MD on 8/20/18 for increased frequency of urination. Pt reports intermittent dizziness with quick change in position which MD is aware of. Pt reports intermittent R groin pain. Pt reports amb with SPC intermittently when LBP or LE pain is bad. MRI of l/s indicated DDD and herniated discs, (-) for Fx. Pt denies falls or red flags. Pt demonstrates decreased standing l/s AROM (ext decreased 50% flex decreased 90%, B Rot decreased 75%, L lat flex decreased 50%, R lat flex decreased 75%), no pain MAGDA, pain L glute with prone press up, pain with attempting DKTC and L>R SKTC, decreased B hip flexion PROM to 95 degrees due to pain, (+) R Slump and B 90/90 SLR, (-) SLR and Scour, B LE sensation intact and symmetrical to light touch, decreased R>L glute/hip strength, decreased core strength/stability, fair mechanics with supine to sit transfer, poor mechanics sit to stand transfers, decreased B RF/quad flexibility, TTP L lower t/s paraspinals and QL, B l/s paraspinals, glute med/max, and piriformis, even innominates, antalgic gait with decreased aline with SPC, and decreased functional mobility. FOTO Score: 33/100  ===========================================================================================  Eval Complexity: History MEDIUM  Complexity : 1-2 comorbidities / personal factors will impact the outcome/ POC ;  Examination  HIGH Complexity : 4+ Standardized tests and measures addressing body structure, function, activity limitation and / or participation in recreation ; Presentation MEDIUM Complexity : Evolving with changing characteristics ;   Decision Making MEDIUM Complexity : FOTO score of 26-74; Overall Complexity MEDIUM  Problem List: pain affecting function, decrease ROM, decrease strength, impaired gait/ balance, decrease ADL/ functional abilitiies, decrease activity tolerance, decrease flexibility/ joint mobility and decrease transfer abilities   Treatment Plan may include any combination of the following: Therapeutic exercise, Therapeutic activities, Neuromuscular re-education, Physical agent/modality, Gait/balance training, Manual therapy, Patient education, Self Care training, Functional mobility training, Home safety training and Stair training  Patient / Family readiness to learn indicated by: asking questions and trying to perform skills  Persons(s) to be included in education: patient (P)  Barriers to Learning/Limitations: None  Measures taken:    Patient Goal (s): \"Eliminate pain\"   Patient self reported health status: poor  Rehabilitation Potential: good   Short Term Goals: To be accomplished in  2  weeks:  1. Pt to demonstrate independence with HEP to improve core/glute/LE strength/stability for standing and transfers. 2. Pt to demonstrate correct body mechanics with supine to sit and sit to stand transfers to protect l/s and improve safety and efficiency of transfers at home.  Long Term Goals: To be accomplished in  4-6  weeks:  1. Pt to report 8 point or > improvement on FOTO scores to improve functional mobility for standing and transfers. 2. Pt to demonstrate (-) B Trendelenberg to improve glute strength/stability for prolonged standing and transfers. 3. Pt to demonstrate 50% or > improvement in l/s AROM to improve functional mobility for ADLs.   Frequency / Duration:   Patient to be seen  2  times per week for 4-6  weeks:  Patient / Caregiver education and instruction: self care, activity modification and exercises  G-Codes (GP): N/A  Therapist Signature: Shann Dandy, PT Date: 6/7/9348   Certification Period: N/A Time: 8:44 AM ===========================================================================================  I certify that the above Physical Therapy Services are being furnished while the patient is under my care. I agree with the treatment plan and certify that this therapy is necessary. Physician Signature:        Date:       Time:     Please sign and return to [facility] at [facility], [facility]. or you may fax the signed copy to [phone]. Thank you.

## 2018-08-09 ENCOUNTER — HOSPITAL ENCOUNTER (OUTPATIENT)
Dept: PHYSICAL THERAPY | Age: 53
Discharge: HOME | End: 2018-08-09
Payer: OTHER GOVERNMENT

## 2018-08-09 PROCEDURE — 97110 THERAPEUTIC EXERCISES: CPT

## 2018-08-09 NOTE — PROGRESS NOTES
PHYSICAL THERAPY - DAILY TREATMENT NOTE    Patient Name: James Egan        Date: 2018  : 1965   yes Patient  Verified  Visit #:   2   of   12  Insurance: Payor: Essie Thompson / Plan: Nakita Poe / Product Type: Commerical /      In time: 9:00 Out time: 9:26   Total Treatment Time: 26     Medicare/BCBS Time Tracking (below)   Total Timed Codes (min):  na 1:1 Treatment Time:  na     TREATMENT AREA =  Low back pain [M54.5]    SUBJECTIVE  Pain Level (on 0 to 10 scale):  7  / 10   Medication Changes/New allergies or changes in medical history, any new surgeries or procedures?    no  If yes, update Summary List   Subjective Functional Status/Changes:  []  No changes reported     Pt reports her pain is a steady 7/10 and c/o R LE radicular sx to the knee today. OBJECTIVE  Modalities Rationale:     PD to improve patient's ability to n/a- PD modalities   min [] Estim, type/location:                                      []  att     []  unatt     []  w/US     []  w/ice    []  w/heat    min []  Mechanical Traction: type/lbs                   []  pro   []  sup   []  int   []  cont    []  before manual    []  after manual    min []  Ultrasound, settings/location:      min []  Iontophoresis w/ dexamethasone, location:                                               []  take home patch       []  in clinic    min []  Ice     []  Heat    location/position:     min []  Vasopneumatic Device, press/temp:     min []  Other:    [] Skin assessment post-treatment (if applicable):    []  intact    []  redness- no adverse reaction     []redness  adverse reaction:        26 min Therapeutic Exercise:  [x]  See flow sheet   Rationale:      increase ROM and increase strength to improve the patients ability to ambulate      min Patient Education:  yes  Reviewed HEP   []  Progressed/Changed HEP based on:        Other Objective/Functional Measures:    Pt requested to attempt TM vs Nustep due to difficulty sitting  Pt ambulating w/ SPC R, significant antalgic gait w/ (+) trendelenburg, step to gait, and dec WB through R LE  Withheld standing ex due to inc pain reported  Pain reported supine bridge, mid lower back, pt only able to achieve lift ~1\"       Post Treatment Pain Level (on 0 to 10) scale:   7  / 10     ASSESSMENT  Assessment/Changes in Function:     Initial tx session; pt limited in participation due to inc pain. Pt reported no change in sx post tx session     []  See Progress Note/Recertification   Patient will continue to benefit from skilled PT services to modify and progress therapeutic interventions, address functional mobility deficits, address ROM deficits, address strength deficits, analyze and address soft tissue restrictions, analyze and cue movement patterns, analyze and modify body mechanics/ergonomics, assess and modify postural abnormalities, address imbalance/dizziness and instruct in home and community integration to attain remaining goals.    Progress toward goals / Updated goals:    No significant progress noted this visit     PLAN  []  Upgrade activities as tolerated yes Continue plan of care   []  Discharge due to :    []  Other:      Therapist: Poli Sanchez PT    Date: 8/9/2018 Time: 9:11 AM     Future Appointments  Date Time Provider Merly Ennis   8/15/2018 8:30 AM Poli Sanchez PT [address]   8/17/2018 9:00 AM Poli Sanchez PT [address]   8/20/2018 9:00 AM Katelyn Vance [address] [place] [address] Drive   8/24/2018 8:30 AM Kalli Chen PT [address]   8/29/2018 8:00 AM Poli Sanchez PT [address]   8/31/2018 9:00 AM Poli Sanchez, PT [facility]

## 2018-08-15 ENCOUNTER — HOSPITAL ENCOUNTER (OUTPATIENT)
Dept: PHYSICAL THERAPY | Age: 53
Discharge: HOME | End: 2018-08-15
Payer: OTHER GOVERNMENT

## 2018-08-15 PROCEDURE — 97110 THERAPEUTIC EXERCISES: CPT

## 2018-08-15 PROCEDURE — 97140 MANUAL THERAPY 1/> REGIONS: CPT

## 2018-08-15 NOTE — PROGRESS NOTES
PHYSICAL THERAPY - DAILY TREATMENT NOTE    Patient Name: Carter Reeder        Date: 8/15/2018  : 1965   yes Patient  Verified  Visit #:   3   of   12  Insurance: Payor: Tiarra Bailey / Plan: Genny Jansen / Product Type: Commerical /      In time: 8:30 Out time: 9:07   Total Treatment Time: 37     Medicare/BCBS Time Tracking (below)   Total Timed Codes (min):  na 1:1 Treatment Time:  na     TREATMENT AREA =  Low back pain [M54.5]    SUBJECTIVE  Pain Level (on 0 to 10 scale):  -7  / 10   Medication Changes/New allergies or changes in medical history, any new surgeries or procedures?    no  If yes, update Summary List   Subjective Functional Status/Changes:  []  No changes reported     Pt reports having increased pain and throbbing sensation in center of the distal l/s after last session that lasted until the next day. Reports she took an epsom salt bath the following day and throbbing reduced.           OBJECTIVE  Modalities Rationale:     decrease pain and increase tissue extensibility to improve patient's ability to complete ADLs   min [] Estim, type/location:                                      []  att     []  unatt     []  w/US     []  w/ice    []  w/heat    min []  Mechanical Traction: type/lbs                   []  pro   []  sup   []  int   []  cont    []  before manual    []  after manual    min []  Ultrasound, settings/location:      min []  Iontophoresis w/ dexamethasone, location:                                               []  take home patch       []  in clinic   10 min []  Ice     [x]  Heat    location/position: To l/s in supine w/ bolster    min []  Vasopneumatic Device, press/temp:     min []  Other:    [x] Skin assessment post-treatment (if applicable):    [x]  intact    []  redness- no adverse reaction     []redness  adverse reaction:        27 min Therapeutic Exercise:  [x]  See flow sheet   Rationale:      increase ROM and increase strength to improve the patients ability to ambulate, transfer      min Patient Education:  yes  Reviewed HEP   []  Progressed/Changed HEP based on: Other Objective/Functional Measures:    Pt c/o pulling sensation R l/s following standing hip abd  Added rhomberg stance in // bars to improve balance, pt req frequent hh to // bars to maintain/regain balance     Post Treatment Pain Level (on 0 to 10) scale:   8  / 10     ASSESSMENT  Assessment/Changes in Function:     Pt reported inc pain post tx session. []  See Progress Note/Recertification   Patient will continue to benefit from skilled PT services to modify and progress therapeutic interventions, address functional mobility deficits, address ROM deficits, address strength deficits, analyze and address soft tissue restrictions, analyze and cue movement patterns, analyze and modify body mechanics/ergonomics, assess and modify postural abnormalities and instruct in home and community integration to attain remaining goals.    Progress toward goals / Updated goals:    Min cueing for supine to sit but heavy cueing for sit to supine; progress to STG #2     PLAN  []  Upgrade activities as tolerated yes Continue plan of care   []  Discharge due to :    []  Other:      Therapist: Pooja Palma PT    Date: 8/15/2018 Time: 8:30 AM     Future Appointments  Date Time Provider Merly Ennis   8/17/2018 9:00 AM Pooja Palma PT [facility]   8/20/2018 9:00 AM Katelyn Vance [address] [place] [address]   8/24/2018 8:30 AM Pooja Palma PT [address] [place] [address]   8/29/2018 8:00 AM Pooja Palma PT [facility]   8/31/2018 9:00 AM Pooja Palma PT [address] [place] [address]

## 2018-08-17 ENCOUNTER — HOSPITAL ENCOUNTER (OUTPATIENT)
Dept: PHYSICAL THERAPY | Age: 53
Discharge: HOME | End: 2018-08-17
Payer: OTHER GOVERNMENT

## 2018-08-17 PROCEDURE — 97110 THERAPEUTIC EXERCISES: CPT

## 2018-08-17 NOTE — PROGRESS NOTES
PHYSICAL THERAPY - DAILY TREATMENT NOTE    Patient Name: Bashir Andersen        Date: 2018  : 1965   yes Patient  Verified  Visit #:     Insurance: Payor: Vanessa Vo / Plan: Danii Aragonast / Product Type: Commerical /      In time: 9:02 Out time: 9:26   Total Treatment Time: 24     Medicare/BCBS Time Tracking (below)   Total Timed Codes (min):  na 1:1 Treatment Time:  na     TREATMENT AREA =  Low back pain [M54.5]    SUBJECTIVE  Pain Level (on 0 to 10 scale):   10   Medication Changes/New allergies or changes in medical history, any new surgeries or procedures?    no  If yes, update Summary List   Subjective Functional Status/Changes:  []  No changes reported     Pt reports soreness in the L lateral hip and back after last visit. OBJECTIVE      24 min Therapeutic Exercise:  [x]  See flow sheet   Rationale:      increase ROM, increase strength, improve coordination, improve balance and increase proprioception to improve the patients ability to ambulate, transfer      min Patient Education:  yes  Reviewed HEP   []  Progressed/Changed HEP based on: Other Objective/Functional Measures:    Pt c/o pain L hip w/ LTR therefore discontinued after 9 reps  Unable to complete standing hip abd L to full 10 reps 2' L hip pain  Improved rhomberg balance noted w/ dec reliance on // bars to maintain balance     Post Treatment Pain Level (on 0 to 10) scale:   5  / 10     ASSESSMENT  Assessment/Changes in Function:     Pt reported dec pain post tx session.  Plan to progress functional exercise at [place] as tolerated   []  See Progress Note/Recertification   Patient will continue to benefit from skilled PT services to modify and progress therapeutic interventions, address functional mobility deficits, address ROM deficits, address strength deficits, analyze and address soft tissue restrictions, analyze and cue movement patterns, analyze and modify body mechanics/ergonomics, address imbalance/dizziness and instruct in home and community integration to attain remaining goals.    Progress toward goals / Updated goals:    Pt making slow progress towards all goals     PLAN  []  Upgrade activities as tolerated yes Continue plan of care   []  Discharge due to :    []  Other:      Therapist: April Figueroa PT    Date: 8/17/2018 Time: 9:52 AM     Future Appointments  Date Time Provider Merly Ennis   8/20/2018 9:00 AM Shelley Galvez, PT [facility]   8/29/2018 8:00 AM April Figueroa, PT [facility]   8/31/2018 9:00 AM April Figueroa, PT [facility]   9/5/2018 8:30 AM April Figueroa, PT [facility]

## 2018-08-20 ENCOUNTER — HOSPITAL ENCOUNTER (OUTPATIENT)
Dept: PHYSICAL THERAPY | Age: 53
Discharge: HOME | End: 2018-08-20
Payer: OTHER GOVERNMENT

## 2018-08-20 PROCEDURE — 97110 THERAPEUTIC EXERCISES: CPT

## 2018-08-20 NOTE — PROGRESS NOTES
PHYSICAL THERAPY - DAILY TREATMENT NOTE    Patient Name: Bashir Andersen        Date: 2018  : 1965   yes Patient  Verified  Visit #:      of   12  Insurance: Payor: Vanessa Vo / Plan: Danii Aragonast / Product Type: Commerical /      In time: 9:04 Out time: 9:31   Total Treatment Time: 27     Medicare/BCBS Time Tracking (below)   Total Timed Codes (min):  N/A 1:1 Treatment Time:  N/A     TREATMENT AREA =  Low back pain [M54.5]    SUBJECTIVE  Pain Level (on 0 to 10 scale):  6  / 10   Medication Changes/New allergies or changes in medical history, any new surgeries or procedures?    no  If yes, update Summary List   Subjective Functional Status/Changes:  []  No changes reported     Pt reports she continues to amb with wooden SPC. Pt denies falls or red flags. Avg pain level: 7/10, Max pain 9/10 (end of day after ascending stairs). Pt reports inconsistency with HEP. OBJECTIVE  Modalities Rationale:     N/A to improve patient's ability to N/A - pt declined   min [] Estim, type/location:                                      []  att     []  unatt     []  w/US     []  w/ice    []  w/heat    min []  Mechanical Traction: type/lbs                   []  pro   []  sup   []  int   []  cont    []  before manual    []  after manual    min []  Ultrasound, settings/location:      min []  Iontophoresis w/ dexamethasone, location:                                               []  take home patch       []  in clinic    min []  Ice     []  Heat    location/position:     min []  Vasopneumatic Device, press/temp:     min []  Other:    [] Skin assessment post-treatment (if applicable):    []  intact    []  redness- no adverse reaction     []redness  adverse reaction:        27 min Therapeutic Exercise:  [x]  See flow sheet   Rationale:      increase strength and increase proprioception to improve the patients ability to complete standing, sitting, transfers.      N/A min Patient Education:  yes Reviewed HEP   []  Progressed/Changed HEP based on:  Reviewed importance of body mechanics. Pt reports understanding. Other Objective/Functional Measures:    *Pt had to stop at 4 minutes on TM due to plantar fasciitis pain in L>R foot. Pt reports she will be seeing [facility] today for assessment of plantar fasciitis. Modified standing therapeutic ex due to B foot pain. Post Treatment Pain Level (on 0 to 10) scale:   5 / 10     ASSESSMENT  Assessment/Changes in Function:     Pt denied sharp pains or red flags with therapeutic ex. Pt reported min improvement in pain/sxs at end of session. []  See Progress Note/Recertification   Patient will continue to benefit from skilled PT services to modify and progress therapeutic interventions, address functional mobility deficits, address ROM deficits and address strength deficits to attain remaining goals. Progress toward goals / Updated goals:    Pt reports inconsistency with HEP - has not met STG #1. Reassess FOTO next session to assess progress towards LTG #1. PLAN  [x]  Upgrade activities as tolerated yes Continue plan of care   []  Discharge due to :    [x]  Other: Reassess FOTO and GROC next session.      Therapist: Amanda Uribe PT    Date: 8/20/2018 Time: 9:05 AM     Future Appointments  Date Time Provider Merly Ennis   8/29/2018 8:00 AM Rody Villalta, PT [facility]   8/31/2018 9:00 AM Rody Villalta, PT [facility]   9/5/2018 8:30 AM Rody Villalta, PT [id number] [facility]

## 2018-08-24 ENCOUNTER — APPOINTMENT (OUTPATIENT)
Dept: PHYSICAL THERAPY | Age: 53
End: 2018-08-24
Payer: OTHER GOVERNMENT

## 2018-08-29 ENCOUNTER — HOSPITAL ENCOUNTER (OUTPATIENT)
Dept: PHYSICAL THERAPY | Age: 53
Discharge: HOME | End: 2018-08-29
Payer: OTHER GOVERNMENT

## 2018-08-29 PROCEDURE — 97110 THERAPEUTIC EXERCISES: CPT

## 2018-08-29 NOTE — PROGRESS NOTES
PHYSICAL THERAPY - DAILY TREATMENT NOTE Patient Name: Birdie Bustillo        Date: 2018 : 1965   yes Patient  Verified Visit #:     Insurance: Payor: Jose Alberto Arrieta / Plan: Femi Calderon / Product Type: Federal Funded Programs / In time: 8:13 Out time: 8:49 Total Treatment Time: 36 Medicare/BCBS Time Tracking (below) Total Timed Codes (min):  na 1:1 Treatment Time:  na  
 
TREATMENT AREA =  Low back pain [M54.5] SUBJECTIVE Pain Level (on 0 to 10 scale):  4  / 10 Medication Changes/New allergies or changes in medical history, any new surgeries or procedures?    no  If yes, update Summary List  
Subjective Functional Status/Changes:  []  No changes reported Pt reports her pain is not \"that bad\" today. Reports she does not see the foot doctor until . *Pt arrived 15' late for tx OBJECTIVE Modalities Rationale:     decrease inflammation and decrease pain to improve patient's ability to complete ADLs 
 min [] Estim, type/location:    
                                 []  att     []  unatt     []  w/US     []  w/ice    []  w/heat  
 min []  Mechanical Traction: type/lbs   
               []  pro   []  sup   []  int   []  cont    []  before manual    []  after manual  
 min []  Ultrasound, settings/location:    
 min []  Iontophoresis w/ dexamethasone, location:   
                                           []  take home patch       []  in clinic  
10 min [x]  Ice     []  Heat    location/position: To the l/s in long sit  
 min []  Vasopneumatic Device, press/temp:   
 min []  Other:   
[] Skin assessment post-treatment (if applicable):   
[]  intact    []  redness- no adverse reaction    
[]redness  adverse reaction:     
 
26 min Therapeutic Exercise:  [x]  See flow sheet Rationale:      increase ROM, increase strength, improve coordination, improve balance and increase proprioception to improve the patients ability to ambulate min Patient Education:  yes  Reviewed HEP []  Progressed/Changed HEP based on: Other Objective/Functional Measures: 
 
Able to perform partial standing ex prior to onset of R foot pain 
aded SL clam shell this visit to improve posterolateral hip stability FOTO: 37/100 GROC 0 \"no change\" Post Treatment Pain Level (on 0 to 10) scale:   5  / 10 ASSESSMENT Assessment/Changes in Function:  
 
Pt c/o inc R lower back/upper glute pain following tx session, ambulating w/ inc antalgic gait and forward flexed posture out of clinic. Minimal inc in FOTO score and no change reported in [facility]ZOIE TREJO []  See Progress Note/Recertification Patient will continue to benefit from skilled PT services to modify and progress therapeutic interventions, address functional mobility deficits, address ROM deficits, address strength deficits, analyze and address soft tissue restrictions, analyze and cue movement patterns, analyze and modify body mechanics/ergonomics and instruct in home and community integration to attain remaining goals. Progress toward goals / Updated goals: 
 
4 point improvement in FOTO, progress to LTG #1 PLAN 
[]  Upgrade activities as tolerated yes Continue plan of care  
[]  Discharge due to :   
[]  Other:   
 
Therapist: Hermann Modi PT Date: 8/29/2018 Time: 8:18 AM  
 
Future Appointments Date Time Provider Merly Ennis 8/31/2018 9:00 AM ISHA Ivory [address]  
9/5/2018 8:30 AM [address] [place] [address]

## 2018-08-31 ENCOUNTER — APPOINTMENT (OUTPATIENT)
Dept: PHYSICAL THERAPY | Age: 53
End: 2018-08-31
Payer: OTHER GOVERNMENT

## 2018-09-05 ENCOUNTER — HOSPITAL ENCOUNTER (OUTPATIENT)
Dept: PHYSICAL THERAPY | Age: 53
Discharge: HOME | End: 2018-09-05
Payer: OTHER GOVERNMENT

## 2018-09-05 PROCEDURE — 97110 THERAPEUTIC EXERCISES: CPT

## 2018-09-05 NOTE — PROGRESS NOTES
PHYSICAL THERAPY - DAILY TREATMENT NOTE Patient Name: Libby Duarte        Date: 2018 : 1965   yes Patient  Verified Visit #:     Insurance: Payor: Cash Segura / Plan: Kenan Sapp / Product Type: Federal Funded Programs / In time: 8:34 Out time: 9:04 Total Treatment Time: 30 Medicare/[facility] Time Tracking (below) Total Timed Codes (min):  na 1:1 Treatment Time:  na  
 
TREATMENT AREA =  Low back pain [M54.5] SUBJECTIVE Pain Level (on 0 to 10 scale):  5  / 10 Medication Changes/New allergies or changes in medical history, any new surgeries or procedures?    no  If yes, update Summary List  
Subjective Functional Status/Changes:  []  No changes reported Pt reports minimal improvement in sx w/ PT, reporting max pain 10/10, avg pain 6/10. OBJECTIVE 30 min Therapeutic Exercise:  [x]  See flow sheet Rationale:      increase ROM and increase strength to improve the patients ability to ambulate  
 
 min Patient Education:  yes  Reviewed HEP []  Progressed/Changed HEP based on: Other Objective/Functional Measures: 
 
Cont w/ (+) trendelenburg B Lumbar AROM flex to thighs p!, ext dec 50% p!, R SB to mid thigh, L SB to mid thigh p!, B rot dec 75% p! On LR Post Treatment Pain Level (on 0 to 10) scale:   5  / 10 ASSESSMENT Assessment/Changes in Function:  
 
See DC 
  
[]  See Progress Note/Recertification Patient will continue to benefit from skilled PT services to n/a to attain remaining goals. Progress toward goals / Updated goals: 
 
See DC summary PLAN 
[]  Upgrade activities as tolerated no Continue plan of care [x]  Discharge due to : DC to HEP due to pt obtaining referral for B feet tx. DC to HEP []  Other:   
 
Therapist: Rody Villalta PT Date: 2018 Time: 8:43 AM  
 
No future appointments.

## 2018-09-07 RX ORDER — DULOXETIN HYDROCHLORIDE 60 MG/1
60 CAPSULE, DELAYED RELEASE ORAL
COMMUNITY

## 2018-10-03 NOTE — PROGRESS NOTES
Katelyn Naranjo [facility] PHYSICAL THERAPY 
 [facility], [address] [facility], [address] [place] [address] - Phone: [phone]  Fax: [phone] DISCHARGE SUMMARY Patient Name: Veronica Ziegler : 1965 Treatment/Medical Diagnosis: Low back pain [M54.5] Referral Source: Onesimo Jones NP Date of Initial Visit: 18 Attended Visits: 7 Missed Visits: 0  
 
SUMMARY OF TREATMENT Pt attended 7 sessions of PT for the tx of chronic l/s w/ R>L radiculopathy. PT tx consisted of therex and modalities in order to improve core/hip stability, lumbar ROM, balance/proprioception, functional mobility, and dec pain. Pt was additionally instructed in HEP. CURRENT STATUS Pt reported minimal improvement in sx w/ PT, reporting 10/10 max pain, 6/10 average pain. Continued to demo (+) trendelenburg B, ambulation w/ SPC w/ dec trunk rotation, and painful/labored transfers and bed mobility. Lumbar AROM flex to thighs p!, ext dec 505 p!, L SB to mid thigh p!, R SB to mid thigh, B rot dec 75% p! W/ left rot. FOTO score 37/100, GROC 0, \"no change\". Pt declined advanced HEP, stating she would continue w/ initial HEP prn. Goal/Measure of Progress Goal Met? 1.  Pt to report 8 point or > improvement on FOTO scores to improve functional mobility for standing and transfers. Status at last Eval: 33/100 Current Status: 37/100 progress 2. Pt to demonstrate (-) B Trendelenberg to improve glute strength/stability for prolonged standing and transfers Status at last Eval: (+) trendelenburg Current Status: (+) trendelenburg no 3. Pt to demo 50% or > improvement in l/s AROM to improve functional mobility for ADLs Status at last Eval: ext decreased 50% ,  
flex decreased 90%, B Rot decreased 75%,  
L lat flex decreased 50%,  
R lat flex decreased 75% Current Status: See above no RECOMMENDATIONS Discontinue therapy due to lack of appreciable progress towards goals. If you have any questions/comments please contact us directly at [phone]. Thank you for allowing us to assist in the care of your patient. Therapist Signature: Veena Garcia PT Date: 10/3/18   Time: 10:58 AM

## 2018-10-26 ENCOUNTER — ANESTHESIA EVENT (OUTPATIENT)
Dept: ENDOSCOPY | Age: 53
End: 2018-10-26
Payer: OTHER GOVERNMENT

## 2018-10-29 ENCOUNTER — HOSPITAL ENCOUNTER (OUTPATIENT)
Age: 53
Setting detail: OUTPATIENT SURGERY
Discharge: HOME | End: 2018-10-29
Attending: INTERNAL MEDICINE | Admitting: INTERNAL MEDICINE
Payer: OTHER GOVERNMENT

## 2018-10-29 ENCOUNTER — ANESTHESIA (OUTPATIENT)
Dept: ENDOSCOPY | Age: 53
End: 2018-10-29
Payer: OTHER GOVERNMENT

## 2018-10-29 VITALS
WEIGHT: 210 LBS | HEIGHT: 65 IN | HEART RATE: 70 BPM | RESPIRATION RATE: 18 BRPM | TEMPERATURE: 98.6 F | OXYGEN SATURATION: 100 % | DIASTOLIC BLOOD PRESSURE: 91 MMHG | BODY MASS INDEX: 34.99 KG/M2 | SYSTOLIC BLOOD PRESSURE: 134 MMHG

## 2018-10-29 PROCEDURE — 76060000031 HC ANESTHESIA FIRST 0.5 HR: Performed by: INTERNAL MEDICINE

## 2018-10-29 PROCEDURE — 74011250636 HC RX REV CODE- 250/636

## 2018-10-29 PROCEDURE — 76040000019: Performed by: INTERNAL MEDICINE

## 2018-10-29 PROCEDURE — 74011250636 HC RX REV CODE- 250/636: Performed by: NURSE ANESTHETIST, CERTIFIED REGISTERED

## 2018-10-29 RX ORDER — PROPOFOL 10 MG/ML
INJECTION, EMULSION INTRAVENOUS AS NEEDED
Status: DISCONTINUED | OUTPATIENT
Start: 2018-10-29 | End: 2018-10-29 | Stop reason: HOSPADM

## 2018-10-29 RX ORDER — SODIUM CHLORIDE, SODIUM LACTATE, POTASSIUM CHLORIDE, CALCIUM CHLORIDE 600; 310; 30; 20 MG/100ML; MG/100ML; MG/100ML; MG/100ML
75 INJECTION, SOLUTION INTRAVENOUS CONTINUOUS
Status: DISCONTINUED | OUTPATIENT
Start: 2018-10-30 | End: 2018-10-29 | Stop reason: HOSPADM

## 2018-10-29 RX ADMIN — PROPOFOL 150 MG: 10 INJECTION, EMULSION INTRAVENOUS at 14:45

## 2018-10-29 RX ADMIN — PROPOFOL 100 MG: 10 INJECTION, EMULSION INTRAVENOUS at 14:30

## 2018-10-29 RX ADMIN — SODIUM CHLORIDE, SODIUM LACTATE, POTASSIUM CHLORIDE, AND CALCIUM CHLORIDE 75 ML/HR: 600; 310; 30; 20 INJECTION, SOLUTION INTRAVENOUS at 13:19

## 2018-10-29 NOTE — DISCHARGE INSTRUCTIONS
Colonoscopy: What to Expect at [address] [place] [address]  After you have a colonoscopy, you will stay at the clinic for 1 to 2 hours until the medicines wear off. Then you can go home. But you will need to arrange for a ride. Your doctor will tell you when you can eat and do your other usual activities. Your doctor will talk to you about when you will need your next colonoscopy. Your doctor can help you decide how often you need to be checked. This will depend on the results of your test and your risk for colorectal cancer. After the test, you may be bloated or have gas pains. You may need to pass gas. If a biopsy was done or a polyp was removed, you may have streaks of blood in your stool (feces) for a few days. This care sheet gives you a general idea about how long it will take for you to recover. But each person recovers at a different pace. Follow the steps below to get better as quickly as possible. How can you care for yourself at home? Activity  · Rest when you feel tired. · You can do your normal activities when it feels okay to do so. Diet  · Follow your doctor's directions for eating. · Unless your doctor has told you not to, drink plenty of fluids. This helps to replace the fluids that were lost during the colon prep. · Do not drink alcohol. Medicines  · Your doctor will tell you if and when you can restart your medicines. He or she will also give you instructions about taking any new medicines. · If you take blood thinners, such as warfarin (Coumadin), clopidogrel (Plavix), or aspirin, be sure to talk to your doctor. He or she will tell you if and when to start taking those medicines again. Make sure that you understand exactly what your doctor wants you to do. · If polyps were removed or a biopsy was done during the test, your doctor may tell you not to take aspirin or other anti-inflammatory medicines for a few days. These include ibuprofen (Advil, Motrin) and naproxen (Aleve).   Other instructions  · For your safety, do not drive or operate machinery until the medicine wears off and you can think clearly. Your doctor may tell you not to drive or operate machinery until the day after your test.  · Do not sign legal documents or make major decisions until the medicine wears off and you can think clearly. The anesthesia can make it hard for you to fully understand what you are agreeing to. Follow-up care is a key part of your treatment and safety. Be sure to make and go to all appointments, and call your doctor if you are having problems. It's also a good idea to know your test results and keep a list of the medicines you take. When should you call for help? Call 911 anytime you think you may need emergency care. For example, call if:  · You passed out (lost consciousness). · You pass maroon or bloody stools. · You have severe belly pain. Call your doctor now or seek immediate medical care if:  · Your stools are black and tarlike. · Your stools have streaks of blood, but you did not have a biopsy or any polyps removed. · You have belly pain, or your belly is swollen and firm. · You vomit. · You have a fever. · You are very dizzy. Watch closely for changes in your health, and be sure to contact your doctor if you have any problems. Where can you learn more? Go to [employer]  Enter E264 in the search box to learn more about \"Colonoscopy: What to Expect at Home. \"   © 4460-0013 Healthwise, Incorporated. Care instructions adapted under license by [facility] (which disclaims liability or warranty for this information). This care instruction is for use with your licensed healthcare professional. If you have questions about a medical condition or this instruction, always ask your healthcare professional. [place] [address] any warranty or liability for your use of this information.   Content Version: 99.0.407261; Current as of: November 20, 2015                  DISCHARGE SUMMARY from Nurse     POST-PROCEDURE INSTRUCTIONS:    Call your Physician if you:  ? Observe any excess bleeding. ? Develop a temperature over 100.5o F.  ? Experience abdominal, shoulder or chest pain. ? Notice any signs of decreased circulation or nerve impairment to an extremity such as a change in color, persistent numbness, tingling, coldness or increase in pain. ? Vomit blood or you have nausea and vomiting lasting longer than 4 hours. ? Are unable to take medications. ? Are unable to urinate within 8 hours after discharge following general anesthesia or intravenous sedation. For the next 24 hours after receiving general anesthesia or intravenous sedation, or while taking prescription Narcotics, limit your activities:  ? Do NOT drive a motor vehicle, operate hazard machinery or power tools, or perform tasks that require coordination. The medication you received during your procedure may have some effect on your mental awareness. ? Do NOT make important personal or business decisions. The medication you received during your procedure may have some effect on your mental awareness. ? Do NOT drink alcoholic beverages. These drinks do not mix well with the medications that have been given to you. ? Upon discharge from the hospital, you must be accompanied by a responsible adult. ? Resume your diet as directed by your physician. ? Resume medications as your physician has prescribed. ? Please give a list of your current medications to your Primary Care Provider. ? Please update this list whenever your medications are discontinued, doses are changed, or new medications (including over-the-counter products) are added. ? Please carry medication information at all times in case of emergency situations. These are general instructions for a healthy lifestyle:    No smoking/ No tobacco products/ Avoid exposure to second hand smoke.    Surgeon General's Warning: Quitting smoking now greatly reduces serious risk to your health. Obesity, smoking, and a sedentary lifestyle greatly increase your risk for illness.  A healthy diet, regular physical exercise & weight monitoring are important for maintaining a healthy lifestyle   You may be retaining fluid if you have a history of heart failure or if you experience any of the following symptoms:  Weight gain of 3 pounds or more overnight or 5 pounds in a week, increased swelling in our hands or feet or shortness of breath while lying flat in bed. Please call your doctor as soon as you notice any of these symptoms; do not wait until your next office visit. Recognize signs and symptoms of STROKE:  F  -  Face looks uneven  A  -  Arms unable to move or move unevenly  S  -  Speech slurred or non-existent  T  -  Time to call 911 - as soon as signs and symptoms begin - DO NOT go back to bed or wait to see If you get better - TIME IS BRAIN. Colorectal Screening   Colorectal cancer almost always develops from precancerous polyps (abnormal growths) in the colon or rectum. Screening tests can find precancerous polyps, so that they can be removed before they turn into cancer. Screening tests can also find colorectal cancer early, when treatment works best.  Madison Rosas Speak with your physician about when you should begin screening and how often you should be tested. Educational references and/or instructions provided during this visit included:    \normal      APPOINTMENTS:    Please make a follow-up appointment with your physician. Discharge information has been reviewed with the patient. The patient verbalized understanding.

## 2018-10-29 NOTE — ANESTHESIA POSTPROCEDURE EVALUATION
Procedure(s): 
COLONOSCOPY. Anesthesia Post Evaluation Multimodal analgesia: multimodal analgesia used between 6 hours prior to anesthesia start to PACU discharge Patient location during evaluation: bedside Patient participation: complete - patient participated Level of consciousness: awake Pain management: adequate Airway patency: patent Anesthetic complications: no 
Cardiovascular status: acceptable Respiratory status: acceptable Hydration status: acceptable Visit Vitals /59 Pulse 92 Temp 37 °C (98.6 °F) Resp 13 Ht 5' 5\" (1.651 m) Wt 95.3 kg (210 lb) SpO2 98% Breastfeeding? No  
BMI 34.95 kg/m²

## 2018-10-29 NOTE — H&P
[email or web address] Impression: 1.contipation Hx of colon polyps 
anemia Plan: 1. colo Chief Complaint:  
constipation HPI: 
Arvind Carreon is a 46 y.o. female who is being seen on consult for the above. No gross bleeding, hx of polyps, no pain, no fhx of colon CA. PMH:  
Past Medical History:  
Diagnosis Date  Anemia 3/1/2010  Ankle fracture 5/09  
 right ankle  Cervical spine pain  Degenerative cervical disc  Seasonal allergic rhinitis  Thyroid disease PSH:  
Past Surgical History:  
Procedure Laterality Date  HX GI  1990\", 2016  
 bowl obstruction repair x2  
 HX GYN  [id number]  
 etopic pregnancy  HX SUSANNA AND BSO  12/2015  HX TUBAL LIGATION Social HX:  
Social History Socioeconomic History  Marital status:  Spouse name: Not on file  Number of children: Not on file  Years of education: Not on file  Highest education level: Not on file Social Needs  Financial resource strain: Not on file  Food insecurity - worry: Not on file  Food insecurity - inability: Not on file  Transportation needs - medical: Not on file  Transportation needs - non-medical: Not on file Occupational History  Not on file Tobacco Use  Smoking status: Never Smoker  Smokeless tobacco: Never Used Substance and Sexual Activity  Alcohol use: Yes  Drug use: No  
 Sexual activity: No  
Other Topics Concern  Not on file Social History Narrative  Not on file FHX:  
Family History Problem Relation Age of Onset  Cancer Father   
     lung cancer  Diabetes Mother  Hypertension Mother  Diabetes Maternal Aunt  Diabetes Maternal Grandmother Allergy: Allergies Allergen Reactions  Levaquin [Levofloxacin] Hives, Rash and Itching Home Medications:  
 
Medications Prior to Admission Medication Sig  
  DULoxetine (CYMBALTA) 60 mg capsule Take 60 mg by mouth nightly.  sulindac (CLINORIL) 200 mg tablet Take 200 mg by mouth two (2) times daily as needed.  pregabalin (LYRICA) 50 mg capsule Take 50 mg by mouth every morning. Indications: 100 mg taken at night  amphetamine-dextroamphetamine XR (ADDERALL XR) 20 mg XR capsule Take 40 mg by mouth daily.  cetirizine (ZYRTEC) 10 mg tablet Take 1 Tab by mouth nightly as needed for Allergies. Review of Systems:  
 
Constitutional: No fevers, chills, weight loss, fatigue. Skin: No rashes, pruritis, jaundice, ulcerations, erythema. HENT: No headaches, nosebleeds, sinus pressure, rhinorrhea, sore throat. Eyes: No visual changes, blurred vision, eye pain, photophobia, jaundice. Cardiovascular: No chest pain, heart palpitations. Respiratory: No cough, SOB, wheezing, chest discomfort, orthopnea. Gastrointestinal: constipation Genitourinary: No dysuria, bleeding, discharge, pyuria. Musculoskeletal: No weakness, arthralgias, wasting. Endo: No sweats. Heme: No bruising, easy bleeding. Allergies: As noted. Neurological: Cranial nerves intact. Alert and oriented. Gait not assessed. Psychiatric:  No anxiety, depression, hallucinations. Visit Vitals /78 Pulse 67 Temp 98.5 °F (36.9 °C) Resp 21 Ht 5' 5\" (1.651 m) Wt 95.3 kg (210 lb) LMP 01/30/2016 SpO2 100% Breastfeeding? No  
BMI 34.95 kg/m² Physical Assessment:  
 
constitutional: appearance: well developed, well nourished, normal habitus, no deformities, in no acute distress. skin: inspection: no rashes, ulcers, icterus or other lesions; no clubbing or telangiectasias. palpation: no induration or subcutaneos nodules. eyes: inspection: normal conjunctivae and lids; no jaundice pupils: symmetrical, normoreactive to light, normal accommodation and size. ENMT: mouth: normal oral mucosa,lips and gums; good dentition.  oropharynx: normal tongue, hard and soft palate; posterior pharynx without erithema, exudate or lesions. neck: thyroid: normal size, consistency and position; no masses or tenderness. respiratory: effort: normal chest excursion; no intercostal retraction or accessory muscle use. cardiovascular: abdominal aorta: normal size and position; no bruits. palpation: PMI of normal size and position; normal rhythm; no thrill or murmurs. abdominal: abdomen: normal consistency; no tenderness or masses. hernias: no hernias appreciated. liver: normal size and consistency. spleen: not palpable. rectal: hemoccult/guaiac: not performed. musculoskeletal: digits and nails: no clubbing, cyanosis, petechiae or other inflammatory conditions. gait: normal gait and station head and neck: normal range of motion; no pain, crepitation or contracture. spine/ribs/pelvis: normal range of motion; no pain, deformity or contracture. lymphatic: axilae: not palpable. groin: not palpable. neck: within normal limits. other: not palpable. neurologic: cranial nerves: II-XII normal.  
psychiatric: judgement/insight: within normal limits. memory: within normal limits for recent and remote events. mood and affect: no evidence of depression, anxiety or agitation. orientation: oriented to time, space and person. Basic Metabolic Profile No results for input(s): NA, K, CL, CO2, BUN, GLU, CA, MG, PHOS in the last 72 hours. No lab exists for component: CREAT  
  
CBC w/Diff No results for input(s): WBC, RBC, HGB, HCT, MCV, MCH, MCHC, RDW, PLT, HGBEXT, HCTEXT, PLTEXT in the last 72 hours. No lab exists for component: MPV No results for input(s): GRANS, LYMPH, EOS, PRO, MYELO, METAS, BLAST in the last 72 hours. No lab exists for component: MONO, BASO Hepatic Function No results for input(s): ALB, TP, TBILI, GPT, SGOT, AP, AML, LPSE in the last 72 hours. No lab exists for component: MARYSE Rudd MD, M.D.  
 [facility], [address] 
[email or web address]

## 2018-10-29 NOTE — ANESTHESIA PREPROCEDURE EVALUATION
Anesthetic History No history of anesthetic complications Review of Systems / Medical History Patient summary reviewed, nursing notes reviewed and pertinent labs reviewed Pulmonary Within defined limits Neuro/Psych Psychiatric history Comments: depression Cardiovascular Exercise tolerance: >4 METS 
  
GI/Hepatic/Renal 
Within defined limits Endo/Other Hypothyroidism Obesity and arthritis Other Findings Comments: Neck pain Physical Exam 
 
Airway Mallampati: III 
TM Distance: 4 - 6 cm Neck ROM: normal range of motion Mouth opening: Normal 
 
 Cardiovascular Regular rate and rhythm,  S1 and S2 normal,  no murmur, click, rub, or gallop Rhythm: regular Rate: normal 
 
 
 
 Dental 
 
Dentition: Poor dentition Pulmonary Breath sounds clear to auscultation Abdominal 
GI exam deferred Other Findings Anesthetic Plan ASA: 3 Anesthesia type: MAC Anesthetic plan and risks discussed with: Patient

## 2019-08-29 ENCOUNTER — APPOINTMENT (OUTPATIENT)
Dept: PHYSICAL THERAPY | Age: 54
End: 2019-08-29

## 2019-09-05 ENCOUNTER — HOSPITAL ENCOUNTER (OUTPATIENT)
Dept: PHYSICAL THERAPY | Age: 54
Discharge: HOME | End: 2019-09-05
Payer: COMMERCIAL

## 2019-09-05 PROCEDURE — 97162 PT EVAL MOD COMPLEX 30 MIN: CPT

## 2019-09-05 PROCEDURE — 97110 THERAPEUTIC EXERCISES: CPT

## 2019-09-05 PROCEDURE — 97530 THERAPEUTIC ACTIVITIES: CPT

## 2019-09-05 NOTE — PROGRESS NOTES
[address] [place] [address], [place] [address],[facility], [address] - Phone: [phone]  Fax: [phone] / [phone] [facility]  Patient Name: Rachelle Dudley : 1965   Medical   Diagnosis: Plantar fascial fibromatosis [M72.2] Treatment Diagnosis: Plantar fascial fibromatosis [M72.2]   Onset Date:      Referral Source: Zeny Foote MD [place] of Care [facility]): 2019   Prior Hospitalization: See medical history Provider #: [id number]   Prior Level of Function: Intermittent JAMIE foot pain with initial steps after rising, unrestricted performance of household chores   Comorbidities: Allergies, Angina, Arthritis, Back pain, BMI over 30, Depression, Gastrointestinal Disease, Headaches, Prior Surgery, Prosthesis / Implants, Sleep dysfunction   Medications: Verified on Patient Summary List   The Plan of Care and following information is based on the information from the initial evaluation.   ===========================================================================================  Assessment / key information:  Patient is 53 y.o. female who presents to [facility], [facility]. with diagnosis of Plantar fascial fibromatosis [M72.2]. Patient reports JAMIE ankle/foot pain that began in the  with ambulation in [employer] boots. Reports previous participation in PT services and JAMIE corticosteroid injecections for JAMIE foot pain with limited benefit. Pain is described as a constant ache and pins in JAMIE arches, heels, and medial ankles. In addition, patient reports intermittent numbness and tingling along the lateral aspect of the feet.  Pain is rated at 2/10 at the best, 5/10 currently, and 10/10 at the worst. Pain increases with rising in the morning, performance of household chores, standing after prolonged sitting, ambulation on uneven terrains, prolonged standing, and prolonged ambulation (15-20 minutes), decreases with rest. Upon objective evaluation, patient demonstrates significantly decreased right ankle DF AROM (-20 deg),  impaired JAMIE ankle MMT (in PF 2, DF 4/4+, Inv 3+/4, Ev 4+/5-), decreased static standing balance (R/L SLS = 1/6\"), and decreased flexibility of the gastrocnemius and soleus. Patient is ambulating mod I with SPC with gait deviations of JAMIE ankle eversion and decreased DF with initial contact. There is tenderness to palpation over JAMIE piriformis, gastrocnemius, and soleus. Patient scored 40 on FOTO indicating decreased function and quality of life. Patient can benefit from PT to decrease JAMIE ankle/foot pain and TTP, increase ROM, strength, flexibility, balance and joint mobility to improve functional ability. L(0-5) R (0-5)   Psoas (L1,2) 4- 2+   Quadriceps (L3,4) 5- 3   Ant Tibialis (L4) 4+ 4   Extensor Hallicus (L5) 4 2+   Gluteus Medius (L5) 4 2+   Gastrocnemius (S1, S2) 2+ 2   Hamstring (S1,2) 4+ 2+   Gluteus Kaden (S1, S2) 2+ 2     ===========================================================================================  Eval Complexity: History MEDIUM  Complexity : 1-2 comorbidities / personal factors will impact the outcome/ POC ;  Examination  HIGH Complexity : 4+ Standardized tests and measures addressing body structure, function, activity limitation and / or participation in recreation ; Presentation MEDIUM Complexity : Evolving with changing characteristics ;   Decision Making MEDIUM Complexity : FOTO score of 26-74; Overall Complexity MEDIUM  Problem List: pain affecting function, decrease ROM, decrease strength, edema affecting function, impaired gait/ balance, decrease ADL/ functional abilitiies, decrease activity tolerance, decrease flexibility/ joint mobility and decrease transfer abilities   Treatment Plan may include any combination of the following: Therapeutic exercise, Therapeutic activities, Neuromuscular re-education, Physical agent/modality, Gait/balance training, Manual therapy, Patient education, Self Care training and Functional mobility training  Patient / Family readiness to learn indicated by: asking questions, trying to perform skills and interest  Persons(s) to be included in education: patient (P)  Barriers to Learning/Limitations: None  Measures taken, if barriers to learning:    Patient Goal (s): \"no pain\"   Patient self reported health status: poor  Rehabilitation Potential: good   Short Term Goals: To be accomplished in  2  weeks:  1. Establish HEP. 2.  Patient to report >/=25% improvement in initial steps with rising in the morning.  Long Term Goals: To be accomplished in  6  weeks:  1. Pt independent in HEP. 2.  Patient will report decreased c/o JAMIE foot pain to < or = 6/10 at the worst to facilitate improved walking tolerance. 3.  Patient to demonstrate >/=15 sec of JAMIE SLS on firm surface in order to improve ease with ambulation on uneven terrains. Frequency / Duration:   Patient to be seen  2  times per week for 6  weeks:  Patient / Caregiver education and instruction: self care, activity modification, brace/ splint application and exercises  Therapist Signature: Marianela Hicks Date: 3/6/0306   Certification Period: n/a Time: 8:48 AM   ===========================================================================================  I certify that the above Physical Therapy Services are being furnished while the patient is under my care. I agree with the treatment plan and certify that this therapy is necessary. Physician Signature:        Date:       Time:     Please sign and return to [facility] at [facility] [place], [facility]. or you may fax the signed copy to [phone]. Thank you.

## 2019-09-05 NOTE — PROGRESS NOTES
PHYSICAL THERAPY - DAILY TREATMENT NOTE    Patient Name: Danish Gaw        Date: 2019  : 1965   Yes Patient  Verified  Visit #:   1   of   10  Insurance: Payor: Cyril Davenport / Plan: Bal Lagunas / Product Type: Federal Funded Programs /      In time: 8:42 Out time: 9:25   Total Treatment Time: 43     Medicare/BCBS Time Tracking (below)   Total Timed Codes (min):  10 1:1 Treatment Time:  10     TREATMENT AREA =  Plantar fascial fibromatosis [M72.2]    SUBJECTIVE  Pain Level (on 0 to 10 scale):  5  / 10   Medication Changes/New allergies or changes in medical history, any new surgeries or procedures?    no  If yes, update Summary List   Subjective Functional Status/Changes:  []  No changes reported     See POC         OBJECTIVE    10 min Therapeutic Activity: [x]  Patient education on POC/establishment of goals, predicted treatment interventions, role of PT in order to address functional impairments, pain, and mobility limitations.  Affect of prolonged sitting on mm length and flexibility, issued calf and soleus stretch with belt   Rationale:    increase ROM, increase strength and flexibility to improve the patients ability to rise in the morning    Billed With/As:   [x] TE   [] TA   [] Neuro   [] Self Care Patient Education: [x] Review HEP    [x] Progressed/Changed HEP based on:   [x] positioning   [x] body mechanics   [] transfers   [] heat/ice application    [] other:      Other Objective/Functional Measures:    See POC     Post Treatment Pain Level (on 0 to 10) scale:   5  / 10     ASSESSMENT  Assessment/Changes in Function:     See POC     []  See Progress Note/Recertification   Patient will continue to benefit from skilled PT services to modify and progress therapeutic interventions, address functional mobility deficits, address ROM deficits, address strength deficits, analyze and address soft tissue restrictions, analyze and cue movement patterns, analyze and modify body mechanics/ergonomics, assess and modify postural abnormalities and instruct in home and community integration to attain remaining goals. Progress toward goals / Updated goals:    See newly established goals in POC     PLAN  [x]  Upgrade activities as tolerated yes Continue plan of care   []  Discharge due to :    []  Other:      Therapist: Wanda Drummond    Date: 9/5/2019 Time: 9:25 AM     No future appointments.

## 2019-09-11 ENCOUNTER — HOSPITAL ENCOUNTER (OUTPATIENT)
Dept: PHYSICAL THERAPY | Age: 54
Discharge: HOME | End: 2019-09-11
Payer: COMMERCIAL

## 2019-09-11 PROCEDURE — 97140 MANUAL THERAPY 1/> REGIONS: CPT

## 2019-09-11 PROCEDURE — 97110 THERAPEUTIC EXERCISES: CPT

## 2019-09-11 NOTE — PROGRESS NOTES
PHYSICAL THERAPY - DAILY TREATMENT NOTE    Patient Name: Phil Clark        Date: 2019  : 1965   yes Patient  Verified  Visit #:   2   of   10  Insurance: Payor: Cole Hitchcock / Plan: Pola Mark / Product Type: Federal Funded Programs /      In time: 5:15 Out time: 6:04   Total Treatment Time: 51     Medicare/BCBS Time Tracking (below)   Total Timed Codes (min):  n/a 1:1 Treatment Time:  n/a     TREATMENT AREA =  Plantar fascial fibromatosis [M72.2]    SUBJECTIVE  Pain Level (on 0 to 10 scale):  4  / 10   Medication Changes/New allergies or changes in medical history, any new surgeries or procedures?    no  If yes, update Summary List   Subjective Functional Status/Changes:  []  No changes reported     For the summer Abigail just been doing crocs and the slides things. Doing the stretches and feels pull in calf seems to help in the morning         OBJECTIVE  Modalities Rationale:       39 min Therapeutic Exercise:  [x]  See flow sheet   Rationale:      increase ROM, increase strength, improve coordination and improve balance to improve the patients ability to perform standing ADLs     12 min Manual Therapy: Prone STM to R>L gastrocnemius and soleus muscles   Rationale:      decrease pain, increase ROM, increase tissue extensibility and decrease trigger points to improve patient's ability to rise in the morning. Billed With/As:   [x] TE   [] TA   [] Neuro   [] Self Care Patient Education: [x] Review HEP    [] Progressed/Changed HEP based on:   [] positioning   [] body mechanics   [] transfers   [] heat/ice application    [] other:      Other Objective/Functional Measures:    Demonstrates increased JAMIE R>L gastrocnemius mm tone   Post Treatment Pain Level (on 0 to 10) scale:   4  / 10     ASSESSMENT  Assessment/Changes in Function:     Pt education on supportive tennis shoes/footwear for mgt of JAMIE heel pain and decr stress on feet with WBing and functional mobility.  Continue to advise patient in importance of repeated DF with or without belt prior to taking initial steps in morning or after prolonged sitting. Good tolerance for stretching therex as patient denied incr in sx s/p session     []  See Progress Note/Recertification   Patient will continue to benefit from skilled PT services to modify and progress therapeutic interventions, address functional mobility deficits, address ROM deficits, address strength deficits, analyze and address soft tissue restrictions, analyze and cue movement patterns, analyze and modify body mechanics/ergonomics, assess and modify postural abnormalities, address imbalance/dizziness and instruct in home and community integration to attain remaining goals. Progress toward goals / Updated goals:    First visit after initial evaluation. Progress tx per POC.         PLAN  [x]  Upgrade activities as tolerated yes Continue plan of care   []  Discharge due to :    []  Other:      Therapist: Jose Sierra    Date: 9/11/2019 Time: 4:10 PM     Future Appointments   Date Time Provider Merly Ennis   9/11/2019  5:00 PM Ursula [facility]   9/19/2019  5:30 PM Bright Carroll, PT [facility]   9/23/2019  5:00 PM Nahid Chan [facility]   9/26/2019  5:00 PM Edgar García, PT [facility]

## 2019-09-19 ENCOUNTER — APPOINTMENT (OUTPATIENT)
Dept: PHYSICAL THERAPY | Age: 54
End: 2019-09-19
Payer: COMMERCIAL

## 2019-09-23 ENCOUNTER — HOSPITAL ENCOUNTER (OUTPATIENT)
Dept: PHYSICAL THERAPY | Age: 54
Discharge: HOME | End: 2019-09-23
Payer: COMMERCIAL

## 2019-09-23 PROCEDURE — 97140 MANUAL THERAPY 1/> REGIONS: CPT

## 2019-09-23 PROCEDURE — 97110 THERAPEUTIC EXERCISES: CPT

## 2019-09-23 NOTE — PROGRESS NOTES
PHYSICAL THERAPY - DAILY TREATMENT NOTE    Patient Name: Benjamin Sauer        Date: 2019  : 1965   yes Patient  Verified  Visit #:   3   of   10  Insurance: Payor: Moriah Egan / Plan: Ok Ruiz / Product Type: [employer] /      In time: 772 Out time: 1536   Total Treatment Time: 39     Medicare/BCBS Time Tracking (below)   Total Timed Codes (min):  na 1:1 Treatment Time:  na     TREATMENT AREA =  Plantar fascial fibromatosis [M72.2]    SUBJECTIVE  Pain Level (on 0 to 10 scale):  4  / 10   Medication Changes/New allergies or changes in medical history, any new surgeries or procedures?    no  If yes, update Summary List   Subjective Functional Status/Changes:  []  No changes reported     Patient reports minimal changes in symptoms since her last visit. Patient reports she does her stretches in the morning with some relief. OBJECTIVE    29 min Therapeutic Exercise:  [x]  See flow sheet   Rationale:      increase ROM and increase strength to improve the patients ability to perform walking activities. 10 min Manual Therapy: Gentle STM to B plantarfascia, B grea toe ext stretch, B ankle DF stretch   Rationale:      decrease pain, increase ROM, increase tissue extensibility and decrease trigger points to improve patient's ability to perform standing activities.      Billed With/As:   [x] TE   [] TA   [] Neuro   [] Self Care Patient Education: [x] Review HEP    [] Progressed/Changed HEP based on:   [] positioning   [] body mechanics   [] transfers   [] heat/ice application    [] other:      Other Objective/Functional Measures:    Patient presenting with poor tolerance and (+) jump sign during manual therapy treatment, limiting effectiveness  Added HR/TR and ankle 4 way during this session for improved ankle and foot strength     Post Treatment Pain Level (on 0 to 10) scale:   5  / 10     ASSESSMENT  Assessment/Changes in Function:     Patient reported elevated pain post session which she related to MT treatment. Plan to discontinue MT treatment if patient unable to tolerate gentle soft tissue mobilization. []  See Progress Note/Recertification   Patient will continue to benefit from skilled PT services to modify and progress therapeutic interventions, address functional mobility deficits, address ROM deficits, address strength deficits, analyze and address soft tissue restrictions, analyze and cue movement patterns, analyze and modify body mechanics/ergonomics and assess and modify postural abnormalities to attain remaining goals.    Progress toward goals / Updated goals:    Progressing with LTG#1     PLAN  [x]  Upgrade activities as tolerated yes Continue plan of care   []  Discharge due to :    []  Other:      Therapist: Abby Shelton PT    Date: 9/23/2019 Time: 12:40 PM     Future Appointments   Date Time Provider Merly Ennis   9/27/2019  8:00 AM Sindy Nunez, PT [facility]   9/30/2019  2:30 PM Sindy Nunez, PT [facility]

## 2019-09-26 ENCOUNTER — APPOINTMENT (OUTPATIENT)
Dept: PHYSICAL THERAPY | Age: 54
End: 2019-09-26
Payer: COMMERCIAL

## 2019-09-27 ENCOUNTER — HOSPITAL ENCOUNTER (OUTPATIENT)
Dept: PHYSICAL THERAPY | Age: 54
Discharge: HOME | End: 2019-09-27
Payer: COMMERCIAL

## 2019-09-27 PROCEDURE — 97110 THERAPEUTIC EXERCISES: CPT

## 2019-09-27 PROCEDURE — 97140 MANUAL THERAPY 1/> REGIONS: CPT

## 2019-09-27 NOTE — PROGRESS NOTES
PHYSICAL THERAPY - DAILY TREATMENT NOTE    Patient Name: Rachael Rodas        Date: 2019  : 1965   yes Patient  Verified  Visit #:   4   of   10  Insurance: Payor: [employer] / Plan: Yadiomaira Saenzs / Product Type: Federal Funded Programs /      In time: 810 Out time: 050   Total Treatment Time: 41     Medicare/[facility] Time Tracking (below)   Total Timed Codes (min):  na 1:1 Treatment Time:  na     TREATMENT AREA =  Plantar fascial fibromatosis [M72.2]    SUBJECTIVE  Pain Level (on 0 to 10 scale):  4  / 10   Medication Changes/New allergies or changes in medical history, any new surgeries or procedures?    no  If yes, update Summary List   Subjective Functional Status/Changes:  []  No changes reported     Patient reports she experienced a little bit of increased soreness after her last visit, but these symptoms subsided after a few hours. Patient states she will be having knee surgery on Wednesday so will be finishing PT next week. OBJECTIVE  33 min Therapeutic Exercise:  [x]  See flow sheet   Rationale:      increase ROM, increase strength, improve coordination and improve balance to improve the patients ability to perform walking activities. 8 min Manual Therapy: Gentle STM to B plantarfascia, B grea toe ext stretch, B ankle DF stretch   Rationale:      decrease pain, increase ROM, increase tissue extensibility and decrease trigger points to improve patient's ability to perform standing activities.      Billed With/As:   [x] TE   [] TA   [] Neuro   [] Self Care Patient Education: [x] Review HEP    [] Progressed/Changed HEP based on:   [] positioning   [] body mechanics   [] transfers   [] heat/ice application    [] other:      Other Objective/Functional Measures:    Continues to lack tolerance to MT, limiting effectiveness  Maintained current exercise program since last visit     Post Treatment Pain Level (on 0 to 10) scale:    10     ASSESSMENT  Assessment/Changes in Function: Patient appropriate to transition to DC at [place] as she will be undergoing surgery for her R knee later in the week, Minimal progress demonstrated since IE at this time. []  See Progress Note/Recertification   Patient will continue to benefit from skilled PT services to modify and progress therapeutic interventions, address functional mobility deficits, address ROM deficits, address strength deficits, analyze and address soft tissue restrictions, analyze and cue movement patterns, analyze and modify body mechanics/ergonomics and assess and modify postural abnormalities to attain remaining goals.    Progress toward goals / Updated goals:    Plan to DC to [facility] at [address]  [x]  Upgrade activities as tolerated yes Continue plan of care   []  Discharge due to :    []  Other:      Therapist: Nerissa Bello PT    Date: 9/27/2019 Time: 9:34 AM     Future Appointments   Date Time Provider Merly Ennis   9/30/2019  2:30 PM [facility]

## 2019-09-30 ENCOUNTER — APPOINTMENT (OUTPATIENT)
Dept: PHYSICAL THERAPY | Age: 54
End: 2019-09-30
Payer: COMMERCIAL

## 2019-11-13 NOTE — PROGRESS NOTES
[address] [place] [address], [address] [place] [address] - Phone: [phone]  Fax: [phone]  DISCHARGE SUMMARY  Patient Name: Crystal Wilson : 1965   Treatment/Medical Diagnosis: Plantar fascial fibromatosis [M72.2]   Referral Source: Namita Vences MD     Date of Initial Visit: 19 Attended Visits: 4 Missed Visits: 3     SUMMARY OF TREATMENT  Patient has attended 4 PT sessions, including an initial evaluation, for JAMIE Plantar Fasciitis. PT has included manual therapy, therapeutic exercises, patient education, body mechanics, and home exercise program to improve ankle ROM/flexibility and ankle stability as well as decrease JAMIE foot symptoms. CURRENT STATUS  The pt progressed slowly with therapy demonstrating little progress since initial evalulation. Patient is appropriate to transition to DC secondary to limited improvement and future R knee surgical procedure. Goal/Measure of Progress Goal Met? 1.  Establish HEP to prevent further disability. Status at last Eval: Goal Established Current Status: HEP established yes   2. Patient to report >/=25% improvement in initial steps with rising in the morning. Status at last Eval: JAMIE foot pain with ambulation after rising  Current Status: Reports \"Some\" Relief with performance of stretching in the morning  Progressing     RECOMMENDATIONS  Discontinue therapy due to lack of appreciable progress towards goals. If you have any questions/comments please contact us directly at [phone]. Thank you for allowing us to assist in the care of your patient.     Therapist Signature: Yamilet Estimable Date: 19     Time: 4:54 PM

## 2024-04-23 NOTE — TELEPHONE ENCOUNTER
From: Sara Garcia  To: Iglesia Blake NP  Sent: 9/19/2017 11:30 AM EDT  Subject: Medication Renewal Request    Original authorizing provider: MANE Adkins Lilly would like a refill of the following medications:  gabapentin (NEURONTIN) 300 mg capsule [Krystina Collins NP]  traMADol (ULTRAM) 50 mg tablet Delphuc Collins NP]    Preferred pharmacy: [facility]. [facility], [address] [facility]     Comment: Name: MARU GARRIDO    Question 1 Daily Activities  Have you been able to perform your daily activities such as getting dressed or making your meals, as you normally would? Please press 1 if you have been able to perform daily activities. Press 2 if you have not been able to. Not performing Daily Activities     Call Status: Attempt 1, Answered     Daily Activities - Issues List: Other, Has a Caregiver/Family Member to Assist   Comments: Patient states that she is at her baseline and her son is her caregiver. No additional assistance needed at this time.

## (undated) DEVICE — Device

## (undated) DEVICE — CATH IV SAFE STR 22GX1IN BLU -- PROTECTIV PLUS